# Patient Record
Sex: FEMALE | Race: WHITE | NOT HISPANIC OR LATINO | Employment: OTHER
[De-identification: names, ages, dates, MRNs, and addresses within clinical notes are randomized per-mention and may not be internally consistent; named-entity substitution may affect disease eponyms.]

---

## 2017-01-12 ENCOUNTER — HOSPITAL (OUTPATIENT)
Dept: OTHER | Age: 67
End: 2017-01-12
Attending: EMERGENCY MEDICINE

## 2017-01-18 ENCOUNTER — HOSPITAL (OUTPATIENT)
Dept: OTHER | Age: 67
End: 2017-01-18
Attending: EMERGENCY MEDICINE

## 2017-01-18 LAB
ANALYZER ANC (IANC): ABNORMAL
ANION GAP SERPL CALC-SCNC: 9 MMOL/L (ref 10–20)
BASOPHILS # BLD: 0 THOUSAND/MCL (ref 0–0.3)
BASOPHILS NFR BLD: 0 %
BUN SERPL-MCNC: 20 MG/DL (ref 10–20)
BUN/CREAT SERPL: 29 (ref 7–25)
CALCIUM SERPL-MCNC: 9.6 MG/DL (ref 8.4–10.2)
CHLORIDE: 104 MMOL/L (ref 98–107)
CO2 SERPL-SCNC: 32 MMOL/L (ref 21–32)
CREAT SERPL-MCNC: 0.69 MG/DL (ref 0.51–0.95)
DIFFERENTIAL METHOD BLD: ABNORMAL
EOSINOPHIL # BLD: 0.3 THOUSAND/MCL (ref 0.1–0.5)
EOSINOPHIL NFR BLD: 3 %
ERYTHROCYTE [DISTWIDTH] IN BLOOD: 13.8 % (ref 11–15)
GLUCOSE SERPL-MCNC: 134 MG/DL (ref 65–99)
HEMATOCRIT: 33.4 % (ref 36–46.5)
HGB BLD-MCNC: 10.8 GM/DL (ref 12–15.5)
LYMPHOCYTES # BLD: 1.8 THOUSAND/MCL (ref 1–4)
LYMPHOCYTES NFR BLD: 22 %
MCH RBC QN AUTO: 28.3 PG (ref 26–34)
MCHC RBC AUTO-ENTMCNC: 32.3 GM/DL (ref 32–36.5)
MCV RBC AUTO: 87.4 FL (ref 78–100)
MONOCYTES # BLD: 0.6 THOUSAND/MCL (ref 0.3–0.9)
MONOCYTES NFR BLD: 7 %
NEUTROPHILS # BLD: 5.7 THOUSAND/MCL (ref 1.8–7.7)
NEUTROPHILS NFR BLD: 68 %
NEUTS SEG NFR BLD: ABNORMAL %
PERCENT NRBC: ABNORMAL
PLATELET # BLD: 276 THOUSAND/MCL (ref 140–450)
POTASSIUM SERPL-SCNC: 3.4 MMOL/L (ref 3.4–5.1)
RBC # BLD: 3.82 MILLION/MCL (ref 4–5.2)
SODIUM SERPL-SCNC: 142 MMOL/L (ref 135–145)
WBC # BLD: 8.4 THOUSAND/MCL (ref 4.2–11)

## 2017-02-17 ENCOUNTER — IMAGING SERVICES (OUTPATIENT)
Dept: OTHER | Age: 67
End: 2017-02-17

## 2017-02-17 LAB
ALBUMIN SERPL-MCNC: 4 GM/DL (ref 3.6–5.1)
ALP SERPL-CCNC: 164 UNIT/L (ref 45–117)
ALT SERPL-CCNC: 20 UNIT/L
AMORPH SED URNS QL MICRO: PRESENT
ANALYZER ANC (IANC): ABNORMAL
ANALYZER ANC (IANC): ABNORMAL
ANION GAP SERPL CALC-SCNC: 10 MMOL/L (ref 10–20)
ANION GAP SERPL CALC-SCNC: 13 MMOL/L (ref 10–20)
APPEARANCE UR: ABNORMAL
AST SERPL-CCNC: 20 UNIT/L
BACTERIA #/AREA URNS HPF: ABNORMAL /HPF
BASOPHILS # BLD: 0 THOUSAND/MCL (ref 0–0.3)
BASOPHILS # BLD: 0 THOUSAND/MCL (ref 0–0.3)
BASOPHILS NFR BLD: 0 %
BASOPHILS NFR BLD: 0 %
BILIRUB CONJ SERPL-MCNC: 0.1 MG/DL (ref 0–0.2)
BILIRUB SERPL-MCNC: 0.6 MG/DL (ref 0.2–1)
BILIRUB UR QL: NEGATIVE
BUN SERPL-MCNC: 11 MG/DL (ref 10–20)
BUN SERPL-MCNC: 14 MG/DL (ref 10–20)
BUN/CREAT SERPL: 21 (ref 7–25)
BUN/CREAT SERPL: 24 (ref 7–25)
CALCIUM SERPL-MCNC: 10 MG/DL (ref 8.4–10.2)
CALCIUM SERPL-MCNC: 9 MG/DL (ref 8.4–10.2)
CAOX CRY URNS QL MICRO: ABNORMAL
CHLORIDE: 102 MMOL/L (ref 98–107)
CHLORIDE: 103 MMOL/L (ref 98–107)
CO2 SERPL-SCNC: 31 MMOL/L (ref 21–32)
CO2 SERPL-SCNC: 31 MMOL/L (ref 21–32)
COLOR UR: YELLOW
CREAT SERPL-MCNC: 0.53 MG/DL (ref 0.51–0.95)
CREAT SERPL-MCNC: 0.58 MG/DL (ref 0.51–0.95)
DIFFERENTIAL METHOD BLD: ABNORMAL
DIFFERENTIAL METHOD BLD: ABNORMAL
EOSINOPHIL # BLD: 0 THOUSAND/MCL (ref 0.1–0.5)
EOSINOPHIL # BLD: 0 THOUSAND/MCL (ref 0.1–0.5)
EOSINOPHIL NFR BLD: 0 %
EOSINOPHIL NFR BLD: 0 %
EPITH CASTS #/AREA URNS LPF: ABNORMAL /[LPF]
ERYTHROCYTE [DISTWIDTH] IN BLOOD: 13.5 % (ref 11–15)
ERYTHROCYTE [DISTWIDTH] IN BLOOD: 13.6 % (ref 11–15)
FATTY CASTS #/AREA URNS LPF: ABNORMAL /[LPF]
GLUCOSE SERPL-MCNC: 169 MG/DL (ref 65–99)
GLUCOSE SERPL-MCNC: 174 MG/DL (ref 65–99)
GLUCOSE UR-MCNC: ABNORMAL MG/DL
GRAN CASTS #/AREA URNS LPF: ABNORMAL /[LPF]
HEMATOCRIT: 37.5 % (ref 36–46.5)
HEMATOCRIT: 39.4 % (ref 36–46.5)
HGB BLD-MCNC: 12.4 GM/DL (ref 12–15.5)
HGB BLD-MCNC: 13.2 GM/DL (ref 12–15.5)
HGB UR QL: NEGATIVE
HYALINE CASTS #/AREA URNS LPF: ABNORMAL /LPF (ref 0–5)
KETONES UR-MCNC: 20 MG/DL
LACTATE BLDV-MCNC: 1.3 MMOL/L
LEUKOCYTE ESTERASE UR QL STRIP: NEGATIVE
LIPASE SERPL-CCNC: 144 UNIT/L (ref 73–393)
LYMPHOCYTES # BLD: 1.3 THOUSAND/MCL (ref 1–4)
LYMPHOCYTES # BLD: 1.5 THOUSAND/MCL (ref 1–4)
LYMPHOCYTES NFR BLD: 16 %
LYMPHOCYTES NFR BLD: 17 %
MCH RBC QN AUTO: 27.7 PG (ref 26–34)
MCH RBC QN AUTO: 28.1 PG (ref 26–34)
MCHC RBC AUTO-ENTMCNC: 33.1 GM/DL (ref 32–36.5)
MCHC RBC AUTO-ENTMCNC: 33.5 GM/DL (ref 32–36.5)
MCV RBC AUTO: 83.7 FL (ref 78–100)
MCV RBC AUTO: 83.8 FL (ref 78–100)
MIXED CELL CASTS #/AREA URNS LPF: ABNORMAL /[LPF]
MONOCYTES # BLD: 0.2 THOUSAND/MCL (ref 0.3–0.9)
MONOCYTES # BLD: 0.3 THOUSAND/MCL (ref 0.3–0.9)
MONOCYTES NFR BLD: 3 %
MONOCYTES NFR BLD: 4 %
MUCOUS THREADS URNS QL MICRO: PRESENT
NEUTROPHILS # BLD: 6.7 THOUSAND/MCL (ref 1.8–7.7)
NEUTROPHILS # BLD: 6.9 THOUSAND/MCL (ref 1.8–7.7)
NEUTROPHILS NFR BLD: 79 %
NEUTROPHILS NFR BLD: 81 %
NEUTS SEG NFR BLD: ABNORMAL %
NEUTS SEG NFR BLD: ABNORMAL %
NITRITE UR QL: NEGATIVE
PERCENT NRBC: ABNORMAL
PERCENT NRBC: ABNORMAL
PH UR: 8 UNIT (ref 5–7)
PLATELET # BLD: 177 THOUSAND/MCL (ref 140–450)
PLATELET # BLD: 196 THOUSAND/MCL (ref 140–450)
POTASSIUM SERPL-SCNC: 3.3 MMOL/L (ref 3.4–5.1)
POTASSIUM SERPL-SCNC: 3.4 MMOL/L (ref 3.4–5.1)
PROT SERPL-MCNC: 7.9 GM/DL (ref 6.4–8.2)
PROT UR QL: NEGATIVE MG/DL
RBC # BLD: 4.48 MILLION/MCL (ref 4–5.2)
RBC # BLD: 4.7 MILLION/MCL (ref 4–5.2)
RBC #/AREA URNS HPF: ABNORMAL /HPF (ref 0–3)
RBC CASTS #/AREA URNS LPF: ABNORMAL /[LPF]
RENAL EPI CELLS #/AREA URNS HPF: ABNORMAL /[HPF]
SODIUM SERPL-SCNC: 141 MMOL/L (ref 135–145)
SODIUM SERPL-SCNC: 143 MMOL/L (ref 135–145)
SP GR UR: 1.01 (ref 1–1.03)
SPECIMEN SOURCE: ABNORMAL
SPERM URNS QL MICRO: ABNORMAL
SQUAMOUS #/AREA URNS HPF: ABNORMAL /HPF (ref 0–5)
T VAGINALIS URNS QL MICRO: ABNORMAL
TRI-PHOS CRY URNS QL MICRO: ABNORMAL
URATE CRY URNS QL MICRO: ABNORMAL
URINE REFLEX: ABNORMAL
URNS CMNT MICRO: ABNORMAL
UROBILINOGEN UR QL: 0.2 MG/DL (ref 0–1)
WAXY CASTS #/AREA URNS LPF: ABNORMAL /[LPF]
WBC # BLD: 8.2 THOUSAND/MCL (ref 4.2–11)
WBC # BLD: 8.7 THOUSAND/MCL (ref 4.2–11)
WBC #/AREA URNS HPF: ABNORMAL /HPF (ref 0–5)
WBC CASTS #/AREA URNS LPF: ABNORMAL /[LPF]
YEAST HYPHAE URNS QL MICRO: ABNORMAL
YEAST URNS QL MICRO: ABNORMAL

## 2017-02-18 LAB
CREAT SERPL-MCNC: 0.52 MG/DL (ref 0.51–0.95)
POTASSIUM SERPL-SCNC: 3.2 MMOL/L (ref 3.4–5.1)

## 2017-02-19 ENCOUNTER — IMAGING SERVICES (OUTPATIENT)
Dept: OTHER | Age: 67
End: 2017-02-19

## 2017-02-19 LAB
ANION GAP SERPL CALC-SCNC: 18 MMOL/L (ref 10–20)
BUN SERPL-MCNC: 28 MG/DL (ref 10–20)
BUN/CREAT SERPL: 38 (ref 7–25)
CALCIUM SERPL-MCNC: 9.6 MG/DL (ref 8.4–10.2)
CHLORIDE: 101 MMOL/L (ref 98–107)
CO2 SERPL-SCNC: 25 MMOL/L (ref 21–32)
CREAT SERPL-MCNC: 0.73 MG/DL (ref 0.51–0.95)
GLUCOSE BLDC GLUCOMTR-MCNC: 116 MG/DL (ref 65–99)
GLUCOSE BLDC GLUCOMTR-MCNC: 200 MG/DL (ref 65–99)
GLUCOSE SERPL-MCNC: 137 MG/DL (ref 65–99)
MAGNESIUM SERPL-MCNC: 2.1 MG/DL (ref 1.7–2.4)
POTASSIUM SERPL-SCNC: 4 MMOL/L (ref 3.4–5.1)
SODIUM SERPL-SCNC: 140 MMOL/L (ref 135–145)

## 2017-02-20 ENCOUNTER — HOSPITAL (OUTPATIENT)
Dept: OTHER | Age: 67
End: 2017-02-20
Attending: FAMILY MEDICINE

## 2017-02-20 LAB
ANALYZER ANC (IANC): ABNORMAL
ANION GAP SERPL CALC-SCNC: 13 MMOL/L (ref 10–20)
ANION GAP SERPL CALC-SCNC: 14 MMOL/L (ref 10–20)
BASOPHILS # BLD: 0 THOUSAND/MCL (ref 0–0.3)
BASOPHILS NFR BLD: 0 %
BUN SERPL-MCNC: 45 MG/DL (ref 10–20)
BUN SERPL-MCNC: 52 MG/DL (ref 10–20)
BUN/CREAT SERPL: 18 (ref 7–25)
BUN/CREAT SERPL: 23 (ref 7–25)
CALCIUM SERPL-MCNC: 9.2 MG/DL (ref 8.4–10.2)
CALCIUM SERPL-MCNC: 9.4 MG/DL (ref 8.4–10.2)
CHLORIDE: 102 MMOL/L (ref 98–107)
CHLORIDE: 103 MMOL/L (ref 98–107)
CO2 SERPL-SCNC: 28 MMOL/L (ref 21–32)
CO2 SERPL-SCNC: 28 MMOL/L (ref 21–32)
CREAT SERPL-MCNC: 2.26 MG/DL (ref 0.51–0.95)
CREAT SERPL-MCNC: 2.47 MG/DL (ref 0.51–0.95)
DIFFERENTIAL METHOD BLD: ABNORMAL
EOSINOPHIL # BLD: 0.1 THOUSAND/MCL (ref 0.1–0.5)
EOSINOPHIL NFR BLD: 1 %
ERYTHROCYTE [DISTWIDTH] IN BLOOD: 14 % (ref 11–15)
GLUCOSE BLDC GLUCOMTR-MCNC: 84 MG/DL (ref 65–99)
GLUCOSE SERPL-MCNC: 130 MG/DL (ref 65–99)
GLUCOSE SERPL-MCNC: 87 MG/DL (ref 65–99)
GLYCOHEMOGLOBIN: 6.3 % (ref 4.5–5.6)
HEMATOCRIT: 41.3 % (ref 36–46.5)
HGB BLD-MCNC: 13.6 GM/DL (ref 12–15.5)
LYMPHOCYTES # BLD: 2.6 THOUSAND/MCL (ref 1–4)
LYMPHOCYTES NFR BLD: 26 %
MCH RBC QN AUTO: 27.6 PG (ref 26–34)
MCHC RBC AUTO-ENTMCNC: 32.9 GM/DL (ref 32–36.5)
MCV RBC AUTO: 83.8 FL (ref 78–100)
MONOCYTES # BLD: 1 THOUSAND/MCL (ref 0.3–0.9)
MONOCYTES NFR BLD: 10 %
NEUTROPHILS # BLD: 6.2 THOUSAND/MCL (ref 1.8–7.7)
NEUTROPHILS NFR BLD: 63 %
NEUTS SEG NFR BLD: ABNORMAL %
PERCENT NRBC: ABNORMAL
PLATELET # BLD: 191 THOUSAND/MCL (ref 140–450)
POTASSIUM SERPL-SCNC: 3.3 MMOL/L (ref 3.4–5.1)
POTASSIUM SERPL-SCNC: 3.6 MMOL/L (ref 3.4–5.1)
RBC # BLD: 4.93 MILLION/MCL (ref 4–5.2)
SODIUM SERPL-SCNC: 140 MMOL/L (ref 135–145)
SODIUM SERPL-SCNC: 141 MMOL/L (ref 135–145)
WBC # BLD: 9.9 THOUSAND/MCL (ref 4.2–11)

## 2017-02-21 ENCOUNTER — IMAGING SERVICES (OUTPATIENT)
Dept: OTHER | Age: 67
End: 2017-02-21

## 2017-02-21 LAB
ALBUMIN SERPL-MCNC: 3.3 GM/DL (ref 3.6–5.1)
ALBUMIN/GLOB SERPL: 0.9 {RATIO} (ref 1–2.4)
ALP SERPL-CCNC: 140 UNIT/L (ref 45–117)
ALT SERPL-CCNC: 15 UNIT/L
ANALYZER ANC (IANC): NORMAL
ANION GAP SERPL CALC-SCNC: 12 MMOL/L (ref 10–20)
AST SERPL-CCNC: 12 UNIT/L
BASOPHILS # BLD: 0 THOUSAND/MCL (ref 0–0.3)
BASOPHILS NFR BLD: 0 %
BILIRUB SERPL-MCNC: 0.3 MG/DL (ref 0.2–1)
BUN SERPL-MCNC: 41 MG/DL (ref 10–20)
BUN/CREAT SERPL: 35 (ref 7–25)
CALCIUM SERPL-MCNC: 9.2 MG/DL (ref 8.4–10.2)
CHLORIDE UR-SCNC: 57 MMOL/L
CHLORIDE: 104 MMOL/L (ref 98–107)
CO2 SERPL-SCNC: 28 MMOL/L (ref 21–32)
CREAT SERPL-MCNC: 1.16 MG/DL (ref 0.51–0.95)
DIFFERENTIAL METHOD BLD: NORMAL
EOSINOPHIL # BLD: 0.2 THOUSAND/MCL (ref 0.1–0.5)
EOSINOPHIL NFR BLD: 2 %
EOSINOPHIL URNS QL WRIGHT STN: NORMAL
ERYTHROCYTE [DISTWIDTH] IN BLOOD: 13.7 % (ref 11–15)
GLOBULIN SER-MCNC: 3.6 GM/DL (ref 2–4)
GLUCOSE SERPL-MCNC: 141 MG/DL (ref 65–99)
HEMATOCRIT: 40.2 % (ref 36–46.5)
HGB BLD-MCNC: 13.3 GM/DL (ref 12–15.5)
LYMPHOCYTES # BLD: 1.8 THOUSAND/MCL (ref 1–4)
LYMPHOCYTES NFR BLD: 25 %
MAGNESIUM SERPL-MCNC: 2 MG/DL (ref 1.7–2.4)
MCH RBC QN AUTO: 27.8 PG (ref 26–34)
MCHC RBC AUTO-ENTMCNC: 33.1 GM/DL (ref 32–36.5)
MCV RBC AUTO: 83.9 FL (ref 78–100)
MONOCYTES # BLD: 0.7 THOUSAND/MCL (ref 0.3–0.9)
MONOCYTES NFR BLD: 9 %
NEUTROPHILS # BLD: 4.8 THOUSAND/MCL (ref 1.8–7.7)
NEUTROPHILS NFR BLD: 64 %
NEUTS SEG NFR BLD: NORMAL %
ORGANIC ACIDS/CREAT UR-SRTO: 121.33 MG/DL
PERCENT NRBC: NORMAL
PLATELET # BLD: 190 THOUSAND/MCL (ref 140–450)
POTASSIUM SERPL-SCNC: 3.4 MMOL/L (ref 3.4–5.1)
POTASSIUM UR-SCNC: 54.6 MMOL/L
PROT SERPL-MCNC: 6.9 GM/DL (ref 6.4–8.2)
RBC # BLD: 4.79 MILLION/MCL (ref 4–5.2)
SODIUM SERPL-SCNC: 141 MMOL/L (ref 135–145)
SODIUM UR-SCNC: 15 MMOL/L
UUN UR-MCNC: 995 MG/DL
WBC # BLD: 7.5 THOUSAND/MCL (ref 4.2–11)

## 2017-02-22 LAB
ALBUMIN SERPL-MCNC: 2.8 GM/DL (ref 3.6–5.1)
ALBUMIN/GLOB SERPL: 0.9 {RATIO} (ref 1–2.4)
ALP SERPL-CCNC: 118 UNIT/L (ref 45–117)
ALT SERPL-CCNC: 12 UNIT/L
ANALYZER ANC (IANC): ABNORMAL
ANION GAP SERPL CALC-SCNC: 11 MMOL/L (ref 10–20)
AST SERPL-CCNC: 11 UNIT/L
BASOPHILS # BLD: 0 THOUSAND/MCL (ref 0–0.3)
BASOPHILS NFR BLD: 0 %
BILIRUB SERPL-MCNC: 0.3 MG/DL (ref 0.2–1)
BUN SERPL-MCNC: 19 MG/DL (ref 6–20)
BUN/CREAT SERPL: 26 (ref 7–25)
CALCIUM SERPL-MCNC: 8.9 MG/DL (ref 8.4–10.2)
CHLORIDE: 110 MMOL/L (ref 98–107)
CHOLEST SERPL-MCNC: 142 MG/DL
CHOLEST/HDLC SERPL: 4.9 {RATIO}
CO2 SERPL-SCNC: 28 MMOL/L (ref 21–32)
CREAT SERPL-MCNC: 0.72 MG/DL (ref 0.51–0.95)
DIFFERENTIAL METHOD BLD: ABNORMAL
EOSINOPHIL # BLD: 0.2 THOUSAND/MCL (ref 0.1–0.5)
EOSINOPHIL NFR BLD: 4 %
ERYTHROCYTE [DISTWIDTH] IN BLOOD: 13.6 % (ref 11–15)
GLOBULIN SER-MCNC: 3.1 GM/DL (ref 2–4)
GLUCOSE BLDC GLUCOMTR-MCNC: 99 MG/DL (ref 65–99)
GLUCOSE SERPL-MCNC: 118 MG/DL (ref 65–99)
HDLC SERPL-MCNC: 29 MG/DL
HEMATOCRIT: 34.4 % (ref 36–46.5)
HGB BLD-MCNC: 11.3 GM/DL (ref 12–15.5)
LDLC SERPL CALC-MCNC: 61 MG/DL
LYMPHOCYTES # BLD: 2.1 THOUSAND/MCL (ref 1–4)
LYMPHOCYTES NFR BLD: 36 %
MAGNESIUM SERPL-MCNC: 1.7 MG/DL (ref 1.7–2.4)
MCH RBC QN AUTO: 27.6 PG (ref 26–34)
MCHC RBC AUTO-ENTMCNC: 32.8 GM/DL (ref 32–36.5)
MCV RBC AUTO: 84.1 FL (ref 78–100)
MONOCYTES # BLD: 0.5 THOUSAND/MCL (ref 0.3–0.9)
MONOCYTES NFR BLD: 9 %
NEUTROPHILS # BLD: 3 THOUSAND/MCL (ref 1.8–7.7)
NEUTROPHILS NFR BLD: 51 %
NEUTS SEG NFR BLD: ABNORMAL %
NONHDLC SERPL-MCNC: 113 MG/DL
PERCENT NRBC: ABNORMAL
PLATELET # BLD: 166 THOUSAND/MCL (ref 140–450)
POTASSIUM SERPL-SCNC: 3.7 MMOL/L (ref 3.4–5.1)
PROT SERPL-MCNC: 5.9 GM/DL (ref 6.4–8.2)
RBC # BLD: 4.09 MILLION/MCL (ref 4–5.2)
SODIUM SERPL-SCNC: 145 MMOL/L (ref 135–145)
TRIGL SERPL-MCNC: 259 MG/DL
WBC # BLD: 5.8 THOUSAND/MCL (ref 4.2–11)

## 2017-04-19 ENCOUNTER — HOSPITAL (OUTPATIENT)
Dept: OTHER | Age: 67
End: 2017-04-19
Attending: EMERGENCY MEDICINE

## 2017-04-19 ENCOUNTER — HOSPITAL (OUTPATIENT)
Dept: OTHER | Age: 67
End: 2017-04-19
Attending: HOSPITALIST

## 2017-04-19 LAB
ALBUMIN SERPL-MCNC: 3.9 GM/DL (ref 3.6–5.1)
ALBUMIN/GLOB SERPL: 1 {RATIO} (ref 1–2.4)
ALP SERPL-CCNC: 148 UNIT/L (ref 45–117)
ALT SERPL-CCNC: 24 UNIT/L
ANALYZER ANC (IANC): ABNORMAL
ANION GAP SERPL CALC-SCNC: 16 MMOL/L (ref 10–20)
AST SERPL-CCNC: 19 UNIT/L
BASOPHILS # BLD: 0 THOUSAND/MCL (ref 0–0.3)
BASOPHILS NFR BLD: 0 %
BILIRUB SERPL-MCNC: 0.5 MG/DL (ref 0.2–1)
BUN SERPL-MCNC: 17 MG/DL (ref 6–20)
BUN/CREAT SERPL: 28 (ref 7–25)
CALCIUM SERPL-MCNC: 9.4 MG/DL (ref 8.4–10.2)
CHLORIDE: 105 MMOL/L (ref 98–107)
CO2 SERPL-SCNC: 26 MMOL/L (ref 21–32)
CREAT SERPL-MCNC: 0.6 MG/DL (ref 0.51–0.95)
DIFFERENTIAL METHOD BLD: ABNORMAL
EOSINOPHIL # BLD: 0 THOUSAND/MCL (ref 0.1–0.5)
EOSINOPHIL NFR BLD: 0 %
ERYTHROCYTE [DISTWIDTH] IN BLOOD: 14.7 % (ref 11–15)
GLOBULIN SER-MCNC: 3.9 GM/DL (ref 2–4)
GLUCOSE SERPL-MCNC: 152 MG/DL (ref 65–99)
HEMATOCRIT: 42.2 % (ref 36–46.5)
HGB BLD-MCNC: 14.2 GM/DL (ref 12–15.5)
LIPASE SERPL-CCNC: 136 UNIT/L (ref 73–393)
LYMPHOCYTES # BLD: 1.5 THOUSAND/MCL (ref 1–4)
LYMPHOCYTES NFR BLD: 15 %
MCH RBC QN AUTO: 28 PG (ref 26–34)
MCHC RBC AUTO-ENTMCNC: 33.6 GM/DL (ref 32–36.5)
MCV RBC AUTO: 83.2 FL (ref 78–100)
MONOCYTES # BLD: 0.4 THOUSAND/MCL (ref 0.3–0.9)
MONOCYTES NFR BLD: 4 %
NEUTROPHILS # BLD: 7.7 THOUSAND/MCL (ref 1.8–7.7)
NEUTROPHILS NFR BLD: 81 %
NEUTS SEG NFR BLD: ABNORMAL %
PERCENT NRBC: ABNORMAL
PLATELET # BLD: 196 THOUSAND/MCL (ref 140–450)
POTASSIUM SERPL-SCNC: 3.4 MMOL/L (ref 3.4–5.1)
PROT SERPL-MCNC: 7.8 GM/DL (ref 6.4–8.2)
RBC # BLD: 5.07 MILLION/MCL (ref 4–5.2)
SODIUM SERPL-SCNC: 144 MMOL/L (ref 135–145)
WBC # BLD: 9.6 THOUSAND/MCL (ref 4.2–11)

## 2017-04-20 LAB
ANION GAP SERPL CALC-SCNC: 14 MMOL/L (ref 10–20)
BUN SERPL-MCNC: 13 MG/DL (ref 6–20)
BUN/CREAT SERPL: 27 (ref 7–25)
CALCIUM SERPL-MCNC: 8.6 MG/DL (ref 8.4–10.2)
CHLORIDE: 104 MMOL/L (ref 98–107)
CO2 SERPL-SCNC: 26 MMOL/L (ref 21–32)
CREAT SERPL-MCNC: 0.46 MG/DL (ref 0.51–0.95)
CREAT SERPL-MCNC: 0.48 MG/DL (ref 0.51–0.95)
GLUCOSE SERPL-MCNC: 150 MG/DL (ref 65–99)
POTASSIUM SERPL-SCNC: 3 MMOL/L (ref 3.4–5.1)
POTASSIUM SERPL-SCNC: 3.7 MMOL/L (ref 3.4–5.1)
SODIUM SERPL-SCNC: 141 MMOL/L (ref 135–145)

## 2017-04-21 LAB
ANION GAP SERPL CALC-SCNC: 14 MMOL/L (ref 10–20)
BUN SERPL-MCNC: 12 MG/DL (ref 6–20)
BUN/CREAT SERPL: 26 (ref 7–25)
CALCIUM SERPL-MCNC: 8.9 MG/DL (ref 8.4–10.2)
CHLORIDE: 103 MMOL/L (ref 98–107)
CO2 SERPL-SCNC: 25 MMOL/L (ref 21–32)
CREAT SERPL-MCNC: 0.46 MG/DL (ref 0.51–0.95)
GLUCOSE SERPL-MCNC: 190 MG/DL (ref 65–99)
POTASSIUM SERPL-SCNC: 3.3 MMOL/L (ref 3.4–5.1)
SODIUM SERPL-SCNC: 139 MMOL/L (ref 135–145)

## 2017-04-27 ENCOUNTER — OFFICE VISIT (OUTPATIENT)
Dept: PAIN CLINIC | Facility: HOSPITAL | Age: 67
End: 2017-04-27
Attending: ANESTHESIOLOGY
Payer: COMMERCIAL

## 2017-04-27 VITALS
WEIGHT: 186 LBS | SYSTOLIC BLOOD PRESSURE: 143 MMHG | DIASTOLIC BLOOD PRESSURE: 76 MMHG | BODY MASS INDEX: 32.96 KG/M2 | HEIGHT: 63 IN | RESPIRATION RATE: 18 BRPM | HEART RATE: 78 BPM

## 2017-04-27 DIAGNOSIS — M79.7 FIBROMYALGIA: Primary | ICD-10-CM

## 2017-04-27 PROCEDURE — 99201 HC OUTPT EVAL AND MGNT NEW PT LEVEL 1: CPT

## 2017-04-27 RX ORDER — GABAPENTIN 300 MG/1
300 CAPSULE ORAL 3 TIMES DAILY
Qty: 90 CAPSULE | Refills: 0 | Status: SHIPPED | OUTPATIENT
Start: 2017-04-27 | End: 2017-09-28

## 2017-04-27 NOTE — CHRONIC PAIN
Pawan Leonard was referred to the CPM by her sister Christie Rodriguez for second opinion regarding chronic pain issues have been diagnosed as fibromyalgia. 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   She describes her pain If he wants the PCP continue to prescribe these therapeutic regimes

## 2017-04-27 NOTE — PROGRESS NOTES
04/27/17  PRESENTS AMBULATORY TO [facility]PM;  NEW CONSULT C/O FIBROMYALGIA PAIN;  RECENT KIARA.  KNEE REPLACEMENTS;  PT STATES SHE HAS 40YR HX OF FIBROMYALGIA;  USING NORCO FOR PAIN CONTROL;  SEEN AT [facility], AND [facility];  RETIRED RN;   EXAMINED BY DR. Brigitte Dennis;  Harsh Antoine

## 2017-09-28 ENCOUNTER — OFFICE VISIT (OUTPATIENT)
Dept: PODIATRY CLINIC | Facility: CLINIC | Age: 67
End: 2017-09-28

## 2017-09-28 DIAGNOSIS — M72.2 PLANTAR FASCIITIS OF RIGHT FOOT: Primary | ICD-10-CM

## 2017-09-28 PROCEDURE — 99213 OFFICE O/P EST LOW 20 MIN: CPT | Performed by: PODIATRIST

## 2017-09-28 PROCEDURE — 99212 OFFICE O/P EST SF 10 MIN: CPT | Performed by: PODIATRIST

## 2017-09-28 PROCEDURE — L3060 FOOT ARCH SUPP LONGITUD/META: HCPCS | Performed by: PODIATRIST

## 2017-09-28 RX ORDER — MELOXICAM 15 MG/1
TABLET ORAL
Qty: 30 TABLET | Refills: 1 | Status: SHIPPED | OUTPATIENT
Start: 2017-09-28 | End: 2018-02-22

## 2017-09-28 RX ORDER — HYDROCHLOROTHIAZIDE 25 MG/1
TABLET ORAL
COMMUNITY
Start: 2017-09-04 | End: 2018-02-22

## 2017-09-28 RX ORDER — CELECOXIB 200 MG/1
CAPSULE ORAL
COMMUNITY
Start: 2017-07-23 | End: 2018-02-22

## 2017-09-28 RX ORDER — MECLIZINE HYDROCHLORIDE 25 MG/1
TABLET ORAL
COMMUNITY
Start: 2017-09-22 | End: 2018-02-22

## 2017-09-28 RX ORDER — TRAZODONE HYDROCHLORIDE 300 MG/1
150 TABLET ORAL NIGHTLY
COMMUNITY
Start: 2017-09-12

## 2017-09-28 RX ORDER — TRAMADOL HYDROCHLORIDE 50 MG/1
TABLET ORAL
COMMUNITY
Start: 2017-09-12 | End: 2018-02-22

## 2017-09-28 NOTE — PROGRESS NOTES
HPI:    Patient ID: Ni Owen is a 75year old female. HPI  This 68-year-old female presents to the office having not been seen in a year and a half she has a new complaint in reference to right heel pain.   The pain is been present for approximately today.  Symptoms are quite consistent with plantar fasciitis. After discussion and review of this condition I dispensed an insole to support the arch. She was instructed to wear shoes at all times no barefoot walking.   She was also placed on meloxicam 15

## 2017-10-02 ENCOUNTER — HOSPITAL (OUTPATIENT)
Dept: OTHER | Age: 67
End: 2017-10-02
Attending: EMERGENCY MEDICINE

## 2017-10-02 LAB
ALBUMIN SERPL-MCNC: 3.7 GM/DL (ref 3.6–5.1)
ALP SERPL-CCNC: 150 UNIT/L (ref 45–117)
ALT SERPL-CCNC: 23 UNIT/L
ANALYZER ANC (IANC): NORMAL
ANION GAP SERPL CALC-SCNC: 16 MMOL/L (ref 10–20)
AST SERPL-CCNC: 24 UNIT/L
BASO+EOS+MONOS # BLD: 0.4 THOUSAND/MCL (ref 0.1–1.1)
BASO+EOS+MONOS NFR BLD: 5 %
BILIRUB CONJ SERPL-MCNC: 0.1 MG/DL (ref 0–0.2)
BILIRUB SERPL-MCNC: 0.6 MG/DL (ref 0.2–1)
BUN SERPL-MCNC: 16 MG/DL (ref 6–20)
BUN/CREAT SERPL: 23 (ref 7–25)
CHLORIDE: 107 MMOL/L (ref 98–107)
CO2 SERPL-SCNC: 25 MMOL/L (ref 21–32)
CREAT SERPL-MCNC: 0.7 MG/DL (ref 0.51–0.95)
DIFFERENTIAL METHOD BLD: NORMAL
ERYTHROCYTE [DISTWIDTH] IN BLOOD: 13.4 % (ref 11–15)
GLUCOSE SERPL-MCNC: 151 MG/DL (ref 65–99)
HEMATOCRIT: 43.9 % (ref 36–46.5)
HGB BLD-MCNC: 15.4 GM/DL (ref 12–15.5)
LIPASE SERPL-CCNC: 146 UNIT/L (ref 73–393)
LYMPHOCYTES # BLD: 1.4 THOUSAND/MCL (ref 1–4)
LYMPHOCYTES NFR BLD: 16 %
MCH RBC QN AUTO: 30.1 PG (ref 26–34)
MCHC RBC AUTO-ENTMCNC: 35.1 GM/DL (ref 32–36.5)
MCV RBC AUTO: 85.9 FL (ref 78–100)
NEUTROPHILS # BLD: 7.3 THOUSAND/MCL (ref 1.8–7.7)
NEUTROPHILS NFR BLD: 79 %
NEUTS SEG NFR BLD: NORMAL %
PLATELET # BLD: 174 THOUSAND/MCL (ref 140–450)
POTASSIUM SERPL-SCNC: 3.3 MMOL/L (ref 3.4–5.1)
PROT SERPL-MCNC: 7.7 GM/DL (ref 6.4–8.2)
RBC # BLD: 5.11 MILLION/MCL (ref 4–5.2)
SODIUM SERPL-SCNC: 145 MMOL/L (ref 135–145)
WBC # BLD: 9.1 THOUSAND/MCL (ref 4.2–11)

## 2017-10-09 ENCOUNTER — OFFICE VISIT (OUTPATIENT)
Dept: PODIATRY CLINIC | Facility: CLINIC | Age: 67
End: 2017-10-09

## 2017-10-09 DIAGNOSIS — M72.2 PLANTAR FASCIITIS OF RIGHT FOOT: Primary | ICD-10-CM

## 2017-10-09 PROCEDURE — 99212 OFFICE O/P EST SF 10 MIN: CPT | Performed by: PODIATRIST

## 2017-10-09 RX ORDER — POTASSIUM CHLORIDE 20 MEQ/1
TABLET, EXTENDED RELEASE ORAL
COMMUNITY
Start: 2017-10-05 | End: 2018-02-22

## 2017-10-09 NOTE — PROGRESS NOTES
HPI:    Patient ID: Luciano Newman is a 75year old female. HPI  This 69-year-old female presents for follow-up care in reference to right heel pain.   She states that the support seemed to initially help but she is not is improved as she would have antic ON#[id number]

## 2017-10-31 ENCOUNTER — HOSPITAL (OUTPATIENT)
Dept: OTHER | Age: 67
End: 2017-10-31
Attending: INTERNAL MEDICINE

## 2017-10-31 LAB
ALBUMIN SERPL-MCNC: 3.8 GM/DL (ref 3.6–5.1)
ALP SERPL-CCNC: 145 UNIT/L (ref 45–117)
ALT SERPL-CCNC: 33 UNIT/L
AMORPH SED URNS QL MICRO: ABNORMAL
ANALYZER ANC (IANC): NORMAL
ANION GAP SERPL CALC-SCNC: 15 MMOL/L (ref 10–20)
APPEARANCE UR: CLEAR
AST SERPL-CCNC: 19 UNIT/L
BACTERIA #/AREA URNS HPF: ABNORMAL /HPF
BASOPHILS # BLD: 0 THOUSAND/MCL (ref 0–0.3)
BASOPHILS NFR BLD: 0 %
BILIRUB CONJ SERPL-MCNC: 0.1 MG/DL (ref 0–0.2)
BILIRUB SERPL-MCNC: 0.4 MG/DL (ref 0.2–1)
BILIRUB UR QL: NEGATIVE
BUN SERPL-MCNC: 12 MG/DL (ref 6–20)
BUN/CREAT SERPL: 18 (ref 7–25)
CALCIUM SERPL-MCNC: 9.5 MG/DL (ref 8.4–10.2)
CAOX CRY URNS QL MICRO: ABNORMAL
CHLORIDE: 102 MMOL/L (ref 98–107)
CO2 SERPL-SCNC: 26 MMOL/L (ref 21–32)
COLOR UR: ABNORMAL
CREAT SERPL-MCNC: 0.66 MG/DL (ref 0.51–0.95)
DIFFERENTIAL METHOD BLD: NORMAL
EOSINOPHIL # BLD: 0.1 THOUSAND/MCL (ref 0.1–0.5)
EOSINOPHIL NFR BLD: 1 %
EPITH CASTS #/AREA URNS LPF: ABNORMAL /[LPF]
ERYTHROCYTE [DISTWIDTH] IN BLOOD: 13 % (ref 11–15)
FATTY CASTS #/AREA URNS LPF: ABNORMAL /[LPF]
GLUCOSE SERPL-MCNC: 118 MG/DL (ref 65–99)
GLUCOSE UR-MCNC: NEGATIVE MG/DL
GRAN CASTS #/AREA URNS LPF: ABNORMAL /[LPF]
HEMATOCRIT: 43.1 % (ref 36–46.5)
HGB BLD-MCNC: 15.2 GM/DL (ref 12–15.5)
HGB UR QL: NEGATIVE
HYALINE CASTS #/AREA URNS LPF: ABNORMAL /LPF (ref 0–5)
KETONES UR-MCNC: NEGATIVE MG/DL
LEUKOCYTE ESTERASE UR QL STRIP: NEGATIVE
LIPASE SERPL-CCNC: 194 UNIT/L (ref 73–393)
LYMPHOCYTES # BLD: 1.6 THOUSAND/MCL (ref 1–4)
LYMPHOCYTES NFR BLD: 19 %
MCH RBC QN AUTO: 30.1 PG (ref 26–34)
MCHC RBC AUTO-ENTMCNC: 35.3 GM/DL (ref 32–36.5)
MCV RBC AUTO: 85.3 FL (ref 78–100)
MIXED CELL CASTS #/AREA URNS LPF: ABNORMAL /[LPF]
MONOCYTES # BLD: 0.6 THOUSAND/MCL (ref 0.3–0.9)
MONOCYTES NFR BLD: 6 %
MUCOUS THREADS URNS QL MICRO: ABNORMAL
NEUTROPHILS # BLD: 6.2 THOUSAND/MCL (ref 1.8–7.7)
NEUTROPHILS NFR BLD: 74 %
NEUTS SEG NFR BLD: NORMAL %
NITRITE UR QL: NEGATIVE
PERCENT NRBC: NORMAL
PH UR: 8 UNIT (ref 5–7)
PLATELET # BLD: 206 THOUSAND/MCL (ref 140–450)
POTASSIUM SERPL-SCNC: 3.4 MMOL/L (ref 3.4–5.1)
PROT SERPL-MCNC: 7.7 GM/DL (ref 6.4–8.2)
PROT UR QL: NEGATIVE MG/DL
RBC # BLD: 5.05 MILLION/MCL (ref 4–5.2)
RBC #/AREA URNS HPF: ABNORMAL /HPF (ref 0–3)
RBC CASTS #/AREA URNS LPF: ABNORMAL /[LPF]
RENAL EPI CELLS #/AREA URNS HPF: ABNORMAL /[HPF]
SODIUM SERPL-SCNC: 140 MMOL/L (ref 135–145)
SP GR UR: 1.01 (ref 1–1.03)
SPECIMEN SOURCE: ABNORMAL
SPERM URNS QL MICRO: ABNORMAL
SQUAMOUS #/AREA URNS HPF: ABNORMAL /HPF (ref 0–5)
T VAGINALIS URNS QL MICRO: ABNORMAL
TRI-PHOS CRY URNS QL MICRO: ABNORMAL
URATE CRY URNS QL MICRO: ABNORMAL
URINE REFLEX: ABNORMAL
URNS CMNT MICRO: ABNORMAL
UROBILINOGEN UR QL: 0.2 MG/DL (ref 0–1)
WAXY CASTS #/AREA URNS LPF: ABNORMAL /[LPF]
WBC # BLD: 8.5 THOUSAND/MCL (ref 4.2–11)
WBC #/AREA URNS HPF: ABNORMAL /HPF (ref 0–5)
WBC CASTS #/AREA URNS LPF: ABNORMAL /[LPF]
YEAST HYPHAE URNS QL MICRO: ABNORMAL
YEAST URNS QL MICRO: ABNORMAL

## 2017-11-01 ENCOUNTER — DIAGNOSTIC TRANS (OUTPATIENT)
Dept: OTHER | Age: 67
End: 2017-11-01

## 2017-11-01 LAB
ALBUMIN SERPL-MCNC: 3.4 GM/DL (ref 3.6–5.1)
ALBUMIN/GLOB SERPL: 1 {RATIO} (ref 1–2.4)
ALP SERPL-CCNC: 126 UNIT/L (ref 45–117)
ALT SERPL-CCNC: 27 UNIT/L
ANALYZER ANC (IANC): NORMAL
ANION GAP SERPL CALC-SCNC: 9 MMOL/L (ref 10–20)
AST SERPL-CCNC: 16 UNIT/L
BASOPHILS # BLD: 0 THOUSAND/MCL (ref 0–0.3)
BASOPHILS NFR BLD: 0 %
BILIRUB SERPL-MCNC: 0.4 MG/DL (ref 0.2–1)
BUN SERPL-MCNC: 10 MG/DL (ref 6–20)
BUN/CREAT SERPL: 16 (ref 7–25)
CALCIUM SERPL-MCNC: 8.6 MG/DL (ref 8.4–10.2)
CHLORIDE: 104 MMOL/L (ref 98–107)
CO2 SERPL-SCNC: 29 MMOL/L (ref 21–32)
CREAT SERPL-MCNC: 0.64 MG/DL (ref 0.51–0.95)
DIFFERENTIAL METHOD BLD: NORMAL
EOSINOPHIL # BLD: 0.1 THOUSAND/MCL (ref 0.1–0.5)
EOSINOPHIL NFR BLD: 1 %
ERYTHROCYTE [DISTWIDTH] IN BLOOD: 12.9 % (ref 11–15)
GLOBULIN SER-MCNC: 3.5 GM/DL (ref 2–4)
GLUCOSE SERPL-MCNC: 131 MG/DL (ref 65–99)
HEMATOCRIT: 41.9 % (ref 36–46.5)
HGB BLD-MCNC: 14.6 GM/DL (ref 12–15.5)
LYMPHOCYTES # BLD: 1.7 THOUSAND/MCL (ref 1–4)
LYMPHOCYTES NFR BLD: 19 %
MAGNESIUM SERPL-MCNC: 1.9 MG/DL (ref 1.7–2.4)
MCH RBC QN AUTO: 29.9 PG (ref 26–34)
MCHC RBC AUTO-ENTMCNC: 34.8 GM/DL (ref 32–36.5)
MCV RBC AUTO: 85.9 FL (ref 78–100)
MONOCYTES # BLD: 0.8 THOUSAND/MCL (ref 0.3–0.9)
MONOCYTES NFR BLD: 9 %
NEUTROPHILS # BLD: 6.3 THOUSAND/MCL (ref 1.8–7.7)
NEUTROPHILS NFR BLD: 71 %
NEUTS SEG NFR BLD: NORMAL %
PERCENT NRBC: NORMAL
PLATELET # BLD: 223 THOUSAND/MCL (ref 140–450)
POTASSIUM SERPL-SCNC: 3.7 MMOL/L (ref 3.4–5.1)
PROT SERPL-MCNC: 6.9 GM/DL (ref 6.4–8.2)
RBC # BLD: 4.88 MILLION/MCL (ref 4–5.2)
SODIUM SERPL-SCNC: 138 MMOL/L (ref 135–145)
TSH SERPL-ACNC: 0.8 MCUNIT/ML (ref 0.35–5)
WBC # BLD: 8.8 THOUSAND/MCL (ref 4.2–11)

## 2017-11-03 ENCOUNTER — DIAGNOSTIC TRANS (OUTPATIENT)
Dept: OTHER | Age: 67
End: 2017-11-03

## 2017-11-03 ENCOUNTER — HOSPITAL (OUTPATIENT)
Dept: OTHER | Age: 67
End: 2017-11-03
Attending: INTERNAL MEDICINE

## 2017-11-03 LAB
ALBUMIN SERPL-MCNC: 3.8 GM/DL (ref 3.6–5.1)
ALP SERPL-CCNC: 132 UNIT/L (ref 45–117)
ALT SERPL-CCNC: 24 UNIT/L
ANALYZER ANC (IANC): NORMAL
ANION GAP SERPL CALC-SCNC: 15 MMOL/L (ref 10–20)
AST SERPL-CCNC: 18 UNIT/L
BASOPHILS # BLD: 0 THOUSAND/MCL (ref 0–0.3)
BASOPHILS NFR BLD: 0 %
BILIRUB CONJ SERPL-MCNC: 0.1 MG/DL (ref 0–0.2)
BILIRUB SERPL-MCNC: 0.2 MG/DL (ref 0.2–1)
BUN SERPL-MCNC: 16 MG/DL (ref 6–20)
BUN/CREAT SERPL: 20 (ref 7–25)
CALCIUM SERPL-MCNC: 9.1 MG/DL (ref 8.4–10.2)
CHLORIDE: 106 MMOL/L (ref 98–107)
CO2 SERPL-SCNC: 25 MMOL/L (ref 21–32)
CREAT SERPL-MCNC: 0.81 MG/DL (ref 0.51–0.95)
DIFFERENTIAL METHOD BLD: NORMAL
EOSINOPHIL # BLD: 0.2 THOUSAND/MCL (ref 0.1–0.5)
EOSINOPHIL NFR BLD: 2 %
ERYTHROCYTE [DISTWIDTH] IN BLOOD: 13 % (ref 11–15)
GLUCOSE SERPL-MCNC: 136 MG/DL (ref 65–99)
HEMATOCRIT: 43 % (ref 36–46.5)
HGB BLD-MCNC: 15.1 GM/DL (ref 12–15.5)
LIPASE SERPL-CCNC: 240 UNIT/L (ref 73–393)
LYMPHOCYTES # BLD: 1.7 THOUSAND/MCL (ref 1–4)
LYMPHOCYTES NFR BLD: 20 %
MCH RBC QN AUTO: 30.6 PG (ref 26–34)
MCHC RBC AUTO-ENTMCNC: 35.1 GM/DL (ref 32–36.5)
MCV RBC AUTO: 87 FL (ref 78–100)
MONOCYTES # BLD: 0.7 THOUSAND/MCL (ref 0.3–0.9)
MONOCYTES NFR BLD: 8 %
NEUTROPHILS # BLD: 6.3 THOUSAND/MCL (ref 1.8–7.7)
NEUTROPHILS NFR BLD: 70 %
NEUTS SEG NFR BLD: NORMAL %
PERCENT NRBC: NORMAL
PLATELET # BLD: 213 THOUSAND/MCL (ref 140–450)
POTASSIUM SERPL-SCNC: 3.8 MMOL/L (ref 3.4–5.1)
PROT SERPL-MCNC: 7.5 GM/DL (ref 6.4–8.2)
RBC # BLD: 4.94 MILLION/MCL (ref 4–5.2)
SODIUM SERPL-SCNC: 142 MMOL/L (ref 135–145)
WBC # BLD: 8.9 THOUSAND/MCL (ref 4.2–11)

## 2017-11-04 LAB
ALBUMIN SERPL-MCNC: 2.8 GM/DL (ref 3.6–5.1)
ALBUMIN/GLOB SERPL: 0.9 {RATIO} (ref 1–2.4)
ALP SERPL-CCNC: 102 UNIT/L (ref 45–117)
ALT SERPL-CCNC: 19 UNIT/L
ANALYZER ANC (IANC): NORMAL
ANION GAP SERPL CALC-SCNC: 12 MMOL/L (ref 10–20)
AST SERPL-CCNC: 13 UNIT/L
BASOPHILS # BLD: 0 THOUSAND/MCL (ref 0–0.3)
BASOPHILS NFR BLD: 0 %
BILIRUB SERPL-MCNC: 0.2 MG/DL (ref 0.2–1)
BUN SERPL-MCNC: 7 MG/DL (ref 6–20)
BUN/CREAT SERPL: 10 (ref 7–25)
CALCIUM SERPL-MCNC: 8.9 MG/DL (ref 8.4–10.2)
CHLORIDE: 108 MMOL/L (ref 98–107)
CO2 SERPL-SCNC: 27 MMOL/L (ref 21–32)
CREAT SERPL-MCNC: 0.73 MG/DL (ref 0.51–0.95)
DIFFERENTIAL METHOD BLD: NORMAL
EOSINOPHIL # BLD: 0.2 THOUSAND/MCL (ref 0.1–0.5)
EOSINOPHIL NFR BLD: 3 %
ERYTHROCYTE [DISTWIDTH] IN BLOOD: 12.8 % (ref 11–15)
GLOBULIN SER-MCNC: 3 GM/DL (ref 2–4)
GLUCOSE SERPL-MCNC: 114 MG/DL (ref 65–99)
HEMATOCRIT: 38.4 % (ref 36–46.5)
HGB BLD-MCNC: 12.9 GM/DL (ref 12–15.5)
LYMPHOCYTES # BLD: 1.7 THOUSAND/MCL (ref 1–4)
LYMPHOCYTES NFR BLD: 19 %
MAGNESIUM SERPL-MCNC: 1.8 MG/DL (ref 1.7–2.4)
MCH RBC QN AUTO: 29.5 PG (ref 26–34)
MCHC RBC AUTO-ENTMCNC: 33.6 GM/DL (ref 32–36.5)
MCV RBC AUTO: 87.7 FL (ref 78–100)
MONOCYTES # BLD: 0.8 THOUSAND/MCL (ref 0.3–0.9)
MONOCYTES NFR BLD: 9 %
NEUTROPHILS # BLD: 6.2 THOUSAND/MCL (ref 1.8–7.7)
NEUTROPHILS NFR BLD: 69 %
NEUTS SEG NFR BLD: NORMAL %
PERCENT NRBC: NORMAL
PLATELET # BLD: 184 THOUSAND/MCL (ref 140–450)
POTASSIUM SERPL-SCNC: 3.6 MMOL/L (ref 3.4–5.1)
PROT SERPL-MCNC: 5.8 GM/DL (ref 6.4–8.2)
RBC # BLD: 4.38 MILLION/MCL (ref 4–5.2)
SODIUM SERPL-SCNC: 143 MMOL/L (ref 135–145)
WBC # BLD: 8.9 THOUSAND/MCL (ref 4.2–11)

## 2017-11-08 ENCOUNTER — OFFICE VISIT (OUTPATIENT)
Dept: PODIATRY CLINIC | Facility: CLINIC | Age: 67
End: 2017-11-08

## 2017-11-08 VITALS — RESPIRATION RATE: 16 BRPM | HEART RATE: 76 BPM | DIASTOLIC BLOOD PRESSURE: 74 MMHG | SYSTOLIC BLOOD PRESSURE: 128 MMHG

## 2017-11-08 DIAGNOSIS — M72.2 PLANTAR FASCIITIS OF RIGHT FOOT: Primary | ICD-10-CM

## 2017-11-08 PROCEDURE — 20550 NJX 1 TENDON SHEATH/LIGAMENT: CPT | Performed by: PODIATRIST

## 2017-11-08 PROCEDURE — 99213 OFFICE O/P EST LOW 20 MIN: CPT | Performed by: PODIATRIST

## 2017-11-08 RX ORDER — METHYLPREDNISOLONE ACETATE 80 MG/ML
20 INJECTION, SUSPENSION INTRA-ARTICULAR; INTRALESIONAL; INTRAMUSCULAR; SOFT TISSUE ONCE
Status: COMPLETED | OUTPATIENT
Start: 2017-11-08 | End: 2017-11-08

## 2017-11-08 RX ORDER — IBUPROFEN 600 MG/1
600 TABLET ORAL
Qty: 60 TABLET | Refills: 1 | Status: SHIPPED | OUTPATIENT
Start: 2017-11-08

## 2017-11-08 RX ADMIN — METHYLPREDNISOLONE ACETATE 20 MG: 80 INJECTION, SUSPENSION INTRA-ARTICULAR; INTRALESIONAL; INTRAMUSCULAR; SOFT TISSUE at 10:22:00

## 2017-11-08 NOTE — PROGRESS NOTES
HPI:    Patient ID: Nicki Mortensen is a 75year old female. HPI  This 57-year-old female continues to have right heel pain. Pain is present with every weightbearing activity and she states that the conservative treatment presently is not helping.   It is (primary encounter diagnosis)      Orders Placed This Encounter      tendon sheath/ligament    Meds This Visit:  Signed Prescriptions Disp Refills    ibuprofen 600 MG Oral Tab 60 tablet 1      Sig: Take 1 tablet (600 mg total) by mouth 3 (three) times ramona

## 2018-02-22 ENCOUNTER — OFFICE VISIT (OUTPATIENT)
Dept: PODIATRY CLINIC | Facility: CLINIC | Age: 68
End: 2018-02-22

## 2018-02-22 VITALS — DIASTOLIC BLOOD PRESSURE: 87 MMHG | HEART RATE: 76 BPM | RESPIRATION RATE: 14 BRPM | SYSTOLIC BLOOD PRESSURE: 161 MMHG

## 2018-02-22 DIAGNOSIS — M72.2 PLANTAR FASCIITIS OF RIGHT FOOT: Primary | ICD-10-CM

## 2018-02-22 PROCEDURE — 99213 OFFICE O/P EST LOW 20 MIN: CPT | Performed by: PODIATRIST

## 2018-02-22 PROCEDURE — 20550 NJX 1 TENDON SHEATH/LIGAMENT: CPT | Performed by: PODIATRIST

## 2018-02-22 RX ORDER — METHYLPREDNISOLONE ACETATE 80 MG/ML
20 INJECTION, SUSPENSION INTRA-ARTICULAR; INTRALESIONAL; INTRAMUSCULAR; SOFT TISSUE ONCE
Status: COMPLETED | OUTPATIENT
Start: 2018-02-22 | End: 2018-02-22

## 2018-02-22 RX ORDER — DULOXETIN HYDROCHLORIDE 30 MG/1
CAPSULE, DELAYED RELEASE ORAL
COMMUNITY
Start: 2018-01-16

## 2018-02-22 RX ADMIN — METHYLPREDNISOLONE ACETATE 20 MG: 80 INJECTION, SUSPENSION INTRA-ARTICULAR; INTRALESIONAL; INTRAMUSCULAR; SOFT TISSUE at 08:45:00

## 2018-02-22 NOTE — PROGRESS NOTES
HPI:    Patient ID: Ni Owen is a 75year old female. HPI  This 50-year-old female presents with a chief complaint of right heel pain. Has been present for the last several months.   The pain that she is experiencing in this right heel is the same understanding         ASSESSMENT/PLAN:   Plantar fasciitis of right foot  (primary encounter diagnosis)      Orders Placed This Encounter      tendon sheath/ligament    Meds This Visit:  No prescriptions requested or ordered in this encounter       Imaging

## 2018-02-22 NOTE — PROGRESS NOTES
Per verbal order from Dr. Sonya Suero, draw up 0.5ml of 0.5% Marcaine and 20 mg of Depo-Medrol for cortisone injection to right heel.   Aniceto Ibarra RN

## 2018-09-13 ENCOUNTER — OFFICE VISIT (OUTPATIENT)
Dept: PODIATRY CLINIC | Facility: CLINIC | Age: 68
End: 2018-09-13
Payer: COMMERCIAL

## 2018-09-13 VITALS — RESPIRATION RATE: 16 BRPM | SYSTOLIC BLOOD PRESSURE: 145 MMHG | HEART RATE: 76 BPM | DIASTOLIC BLOOD PRESSURE: 81 MMHG

## 2018-09-13 DIAGNOSIS — M72.2 PLANTAR FASCIITIS OF RIGHT FOOT: Primary | ICD-10-CM

## 2018-09-13 PROCEDURE — 20550 NJX 1 TENDON SHEATH/LIGAMENT: CPT | Performed by: PODIATRIST

## 2018-09-13 PROCEDURE — 99213 OFFICE O/P EST LOW 20 MIN: CPT | Performed by: PODIATRIST

## 2018-09-13 PROCEDURE — L3060 FOOT ARCH SUPP LONGITUD/META: HCPCS | Performed by: PODIATRIST

## 2018-09-13 PROCEDURE — 99212 OFFICE O/P EST SF 10 MIN: CPT | Performed by: PODIATRIST

## 2018-09-13 NOTE — PROGRESS NOTES
HPI:    Patient ID: Luciano Newman is a 70year old female. HPI  This 58-year-old female presents with right heel pain. Since I last saw this patient she had complete resolution of pain from the injection. Is redeveloped in the last month.   She describ sheath/ligament      Meds This Visit:  Requested Prescriptions      No prescriptions requested or ordered in this encounter       Imaging & Referrals:  None       RX#[id number]

## 2018-09-13 NOTE — PROGRESS NOTES
Per verbal order from Dr. Tessa Li, draw up 0.5ml of 0.25% Marcaine and 20 mg of Depo-Medrol for cortisone injection to the right foot. Jaylene Ontiveros    No post injection vitals taken.

## 2018-09-22 ENCOUNTER — HOSPITAL (OUTPATIENT)
Dept: OTHER | Age: 68
End: 2018-09-22
Attending: FAMILY MEDICINE

## 2018-09-22 LAB
ALBUMIN SERPL-MCNC: 4.2 GM/DL (ref 3.6–5.1)
ALBUMIN/GLOB SERPL: 1 {RATIO} (ref 1–2.4)
ALP SERPL-CCNC: 132 UNIT/L (ref 45–117)
ALT SERPL-CCNC: 29 UNIT/L
AMORPH SED URNS QL MICRO: ABNORMAL
ANALYZER ANC (IANC): ABNORMAL
ANION GAP SERPL CALC-SCNC: 14 MMOL/L (ref 10–20)
APPEARANCE UR: ABNORMAL
AST SERPL-CCNC: 22 UNIT/L
BACTERIA #/AREA URNS HPF: ABNORMAL /HPF
BASOPHILS # BLD: 0 THOUSAND/MCL (ref 0–0.3)
BASOPHILS NFR BLD: 0 %
BILIRUB SERPL-MCNC: 0.6 MG/DL (ref 0.2–1)
BILIRUB UR QL: NEGATIVE
BUN SERPL-MCNC: 15 MG/DL (ref 6–20)
BUN/CREAT SERPL: 25 (ref 7–25)
CALCIUM SERPL-MCNC: 10.1 MG/DL (ref 8.4–10.2)
CAOX CRY URNS QL MICRO: ABNORMAL
CHLORIDE: 101 MMOL/L (ref 98–107)
CO2 SERPL-SCNC: 28 MMOL/L (ref 21–32)
COLOR UR: YELLOW
CREAT SERPL-MCNC: 0.61 MG/DL (ref 0.51–0.95)
DIFFERENTIAL METHOD BLD: ABNORMAL
EOSINOPHIL # BLD: 0 THOUSAND/MCL (ref 0.1–0.5)
EOSINOPHIL NFR BLD: 0 %
EPITH CASTS #/AREA URNS LPF: ABNORMAL /[LPF]
ERYTHROCYTE [DISTWIDTH] IN BLOOD: 11.9 % (ref 11–15)
FATTY CASTS #/AREA URNS LPF: ABNORMAL /[LPF]
GLOBULIN SER-MCNC: 4.1 GM/DL (ref 2–4)
GLUCOSE SERPL-MCNC: 162 MG/DL (ref 65–99)
GLUCOSE UR-MCNC: NEGATIVE MG/DL
GRAN CASTS #/AREA URNS LPF: ABNORMAL /[LPF]
HEMATOCRIT: 47.6 % (ref 36–46.5)
HGB BLD-MCNC: 16.7 GM/DL (ref 12–15.5)
HGB UR QL: ABNORMAL
HYALINE CASTS #/AREA URNS LPF: ABNORMAL /LPF (ref 0–5)
KETONES UR-MCNC: 80 MG/DL
LEUKOCYTE ESTERASE UR QL STRIP: NEGATIVE
LIPASE SERPL-CCNC: 109 UNIT/L (ref 73–393)
LYMPHOCYTES # BLD: 1.2 THOUSAND/MCL (ref 1–4)
LYMPHOCYTES NFR BLD: 10 %
MCH RBC QN AUTO: 30.9 PG (ref 26–34)
MCHC RBC AUTO-ENTMCNC: 35.1 GM/DL (ref 32–36.5)
MCV RBC AUTO: 88.1 FL (ref 78–100)
MIXED CELL CASTS #/AREA URNS LPF: ABNORMAL /[LPF]
MONOCYTES # BLD: 0.3 THOUSAND/MCL (ref 0.3–0.9)
MONOCYTES NFR BLD: 2 %
MUCOUS THREADS URNS QL MICRO: PRESENT
NEUTROPHILS # BLD: 10.2 THOUSAND/MCL (ref 1.8–7.7)
NEUTROPHILS NFR BLD: 88 %
NEUTS SEG NFR BLD: ABNORMAL %
NITRITE UR QL: NEGATIVE
NRBC (NRBCRE): ABNORMAL
PH UR: 7 UNIT (ref 5–7)
PLATELET # BLD: 182 THOUSAND/MCL (ref 140–450)
POTASSIUM SERPL-SCNC: 4 MMOL/L (ref 3.4–5.1)
PROT SERPL-MCNC: 8.3 GM/DL (ref 6.4–8.2)
PROT UR QL: 30 MG/DL
RBC # BLD: 5.4 MILLION/MCL (ref 4–5.2)
RBC #/AREA URNS HPF: ABNORMAL /HPF (ref 0–3)
RBC CASTS #/AREA URNS LPF: ABNORMAL /[LPF]
RENAL EPI CELLS #/AREA URNS HPF: ABNORMAL /[HPF]
SODIUM SERPL-SCNC: 139 MMOL/L (ref 135–145)
SP GR UR: 1.02 (ref 1–1.03)
SPECIMEN SOURCE: ABNORMAL
SPERM URNS QL MICRO: ABNORMAL
SQUAMOUS #/AREA URNS HPF: ABNORMAL /HPF (ref 0–5)
T VAGINALIS URNS QL MICRO: ABNORMAL
TRI-PHOS CRY URNS QL MICRO: ABNORMAL
URATE CRY URNS QL MICRO: ABNORMAL
URINE REFLEX: ABNORMAL
URNS CMNT MICRO: ABNORMAL
UROBILINOGEN UR QL: 0.2 MG/DL (ref 0–1)
WAXY CASTS #/AREA URNS LPF: ABNORMAL /[LPF]
WBC # BLD: 11.7 THOUSAND/MCL (ref 4.2–11)
WBC #/AREA URNS HPF: ABNORMAL /HPF (ref 0–5)
WBC CASTS #/AREA URNS LPF: ABNORMAL /[LPF]
YEAST HYPHAE URNS QL MICRO: ABNORMAL
YEAST URNS QL MICRO: ABNORMAL

## 2018-09-23 LAB
ALBUMIN SERPL-MCNC: 3.4 GM/DL (ref 3.6–5.1)
ALBUMIN/GLOB SERPL: 0.9 {RATIO} (ref 1–2.4)
ALP SERPL-CCNC: 108 UNIT/L (ref 45–117)
ALT SERPL-CCNC: 21 UNIT/L
AMYLASE SERPL-CCNC: 68 UNIT/L (ref 25–115)
ANALYZER ANC (IANC): ABNORMAL
ANION GAP SERPL CALC-SCNC: 15 MMOL/L (ref 10–20)
AST SERPL-CCNC: 15 UNIT/L
BASOPHILS # BLD: 0 THOUSAND/MCL (ref 0–0.3)
BASOPHILS NFR BLD: 0 %
BILIRUB SERPL-MCNC: 0.6 MG/DL (ref 0.2–1)
BUN SERPL-MCNC: 19 MG/DL (ref 6–20)
BUN/CREAT SERPL: 33 (ref 7–25)
CALCIUM SERPL-MCNC: 9.7 MG/DL (ref 8.4–10.2)
CHLORIDE: 103 MMOL/L (ref 98–107)
CO2 SERPL-SCNC: 24 MMOL/L (ref 21–32)
CREAT SERPL-MCNC: 0.57 MG/DL (ref 0.51–0.95)
DIFFERENTIAL METHOD BLD: ABNORMAL
EOSINOPHIL # BLD: 0 THOUSAND/MCL (ref 0.1–0.5)
EOSINOPHIL NFR BLD: 0 %
ERYTHROCYTE [DISTWIDTH] IN BLOOD: 12.1 % (ref 11–15)
GLOBULIN SER-MCNC: 3.7 GM/DL (ref 2–4)
GLUCOSE SERPL-MCNC: 145 MG/DL (ref 65–99)
HEMATOCRIT: 42.9 % (ref 36–46.5)
HGB BLD-MCNC: 14.7 GM/DL (ref 12–15.5)
LIPASE SERPL-CCNC: 88 UNIT/L (ref 73–393)
LYMPHOCYTES # BLD: 1.1 THOUSAND/MCL (ref 1–4)
LYMPHOCYTES NFR BLD: 10 %
MAGNESIUM SERPL-MCNC: 1.8 MG/DL (ref 1.7–2.4)
MCH RBC QN AUTO: 30.3 PG (ref 26–34)
MCHC RBC AUTO-ENTMCNC: 34.3 GM/DL (ref 32–36.5)
MCV RBC AUTO: 88.5 FL (ref 78–100)
MONOCYTES # BLD: 0.6 THOUSAND/MCL (ref 0.3–0.9)
MONOCYTES NFR BLD: 5 %
NEUTROPHILS # BLD: 9.6 THOUSAND/MCL (ref 1.8–7.7)
NEUTROPHILS NFR BLD: 85 %
NEUTS SEG NFR BLD: ABNORMAL %
NRBC (NRBCRE): ABNORMAL
PLATELET # BLD: 166 THOUSAND/MCL (ref 140–450)
POTASSIUM SERPL-SCNC: 3.3 MMOL/L (ref 3.4–5.1)
PROT SERPL-MCNC: 7.1 GM/DL (ref 6.4–8.2)
RBC # BLD: 4.85 MILLION/MCL (ref 4–5.2)
SODIUM SERPL-SCNC: 139 MMOL/L (ref 135–145)
WBC # BLD: 11.4 THOUSAND/MCL (ref 4.2–11)

## 2018-09-24 LAB
CREAT SERPL-MCNC: 0.48 MG/DL (ref 0.51–0.95)
MAGNESIUM SERPL-MCNC: 1.9 MG/DL (ref 1.7–2.4)
POTASSIUM SERPL-SCNC: 3.2 MMOL/L (ref 3.4–5.1)

## 2019-10-16 ENCOUNTER — HOSPITAL (OUTPATIENT)
Dept: OTHER | Age: 69
End: 2019-10-16

## 2019-10-16 ENCOUNTER — DIAGNOSTIC TRANS (OUTPATIENT)
Dept: OTHER | Age: 69
End: 2019-10-16

## 2019-10-16 LAB
ANALYZER ANC (IANC): NORMAL
ANION GAP SERPL CALC-SCNC: 6 MMOL/L (ref 10–20)
APTT PPP: 24 SEC (ref 22–32)
APTT PPP: NORMAL S
APTT PPP: NORMAL S
BASOPHILS # BLD: 0 K/MCL (ref 0–0.3)
BASOPHILS NFR BLD: 1 %
BUN SERPL-MCNC: 19 MG/DL (ref 6–20)
BUN/CREAT SERPL: 29 (ref 7–25)
CALCIUM SERPL-MCNC: 9 MG/DL (ref 8.4–10.2)
CHLORIDE SERPL-SCNC: 109 MMOL/L (ref 98–107)
CO2 SERPL-SCNC: 31 MMOL/L (ref 21–32)
CREAT SERPL-MCNC: 0.65 MG/DL (ref 0.51–0.95)
DIFFERENTIAL METHOD BLD: NORMAL
EOSINOPHIL # BLD: 0.2 K/MCL (ref 0.1–0.5)
EOSINOPHIL NFR BLD: 3 %
ERYTHROCYTE [DISTWIDTH] IN BLOOD: 11.9 % (ref 11–15)
GLUCOSE SERPL-MCNC: 146 MG/DL (ref 65–99)
HCT VFR BLD CALC: 42.1 % (ref 36–46.5)
HGB BLD-MCNC: 13.7 G/DL (ref 12–15.5)
IMM GRANULOCYTES # BLD AUTO: 0 K/MCL (ref 0–0.2)
IMM GRANULOCYTES NFR BLD: 1 %
INR PPP: 1
INR PPP: NORMAL
LYMPHOCYTES # BLD: 1.5 K/MCL (ref 1–4)
LYMPHOCYTES NFR BLD: 25 %
MCH RBC QN AUTO: 30 PG (ref 26–34)
MCHC RBC AUTO-ENTMCNC: 32.5 G/DL (ref 32–36.5)
MCV RBC AUTO: 92.3 FL (ref 78–100)
MONOCYTES # BLD: 0.5 K/MCL (ref 0.3–0.9)
MONOCYTES NFR BLD: 8 %
NEUTROPHILS # BLD: 3.7 K/MCL (ref 1.8–7.7)
NEUTROPHILS NFR BLD: 62 %
NEUTS SEG NFR BLD: NORMAL %
NRBC (NRBCRE): 0 /100 WBC
PLATELET # BLD: 164 K/MCL (ref 140–450)
POTASSIUM SERPL-SCNC: 3.8 MMOL/L (ref 3.4–5.1)
PROTHROMBIN TIME (PRT2): NORMAL
PROTHROMBIN TIME: 10 SEC (ref 9.7–11.8)
RBC # BLD: 4.56 MIL/MCL (ref 4–5.2)
SODIUM SERPL-SCNC: 142 MMOL/L (ref 135–145)
WBC # BLD: 5.9 K/MCL (ref 4.2–11)

## 2019-10-16 PROCEDURE — 99223 1ST HOSP IP/OBS HIGH 75: CPT | Performed by: INTERNAL MEDICINE

## 2019-10-17 ENCOUNTER — HOSPITAL (OUTPATIENT)
Dept: OTHER | Age: 69
End: 2019-10-17

## 2019-10-17 LAB
ALBUMIN SERPL-MCNC: 3.2 G/DL (ref 3.6–5.1)
ALBUMIN/GLOB SERPL: 1.1 {RATIO} (ref 1–2.4)
ALP SERPL-CCNC: 110 UNITS/L (ref 45–117)
ALT SERPL-CCNC: 21 UNITS/L
ANALYZER ANC (IANC): NORMAL
ANALYZER ANC (IANC): NORMAL
ANION GAP SERPL CALC-SCNC: 10 MMOL/L (ref 10–20)
ANION GAP SERPL CALC-SCNC: 10 MMOL/L (ref 10–20)
AST SERPL-CCNC: 18 UNITS/L
BASOPHILS # BLD: 0 K/MCL (ref 0–0.3)
BASOPHILS # BLD: 0 K/MCL (ref 0–0.3)
BASOPHILS NFR BLD: 0 %
BASOPHILS NFR BLD: 1 %
BILIRUB SERPL-MCNC: 0.3 MG/DL (ref 0.2–1)
BUN SERPL-MCNC: 16 MG/DL (ref 6–20)
BUN SERPL-MCNC: 19 MG/DL (ref 6–20)
BUN/CREAT SERPL: 23 (ref 7–25)
BUN/CREAT SERPL: 31 (ref 7–25)
CALCIUM SERPL-MCNC: 8.5 MG/DL (ref 8.4–10.2)
CALCIUM SERPL-MCNC: 8.6 MG/DL (ref 8.4–10.2)
CHLORIDE SERPL-SCNC: 109 MMOL/L (ref 98–107)
CHLORIDE SERPL-SCNC: 110 MMOL/L (ref 98–107)
CO2 SERPL-SCNC: 28 MMOL/L (ref 21–32)
CO2 SERPL-SCNC: 29 MMOL/L (ref 21–32)
CREAT SERPL-MCNC: 0.62 MG/DL (ref 0.51–0.95)
CREAT SERPL-MCNC: 0.71 MG/DL (ref 0.51–0.95)
DIFFERENTIAL METHOD BLD: NORMAL
DIFFERENTIAL METHOD BLD: NORMAL
EOSINOPHIL # BLD: 0.2 K/MCL (ref 0.1–0.5)
EOSINOPHIL # BLD: 0.2 K/MCL (ref 0.1–0.5)
EOSINOPHIL NFR BLD: 2 %
EOSINOPHIL NFR BLD: 4 %
ERYTHROCYTE [DISTWIDTH] IN BLOOD: 11.9 % (ref 11–15)
ERYTHROCYTE [DISTWIDTH] IN BLOOD: 11.9 % (ref 11–15)
GLOBULIN SER-MCNC: 2.8 G/DL (ref 2–4)
GLUCOSE SERPL-MCNC: 117 MG/DL (ref 65–99)
GLUCOSE SERPL-MCNC: 95 MG/DL (ref 65–99)
HCT VFR BLD CALC: 37.3 % (ref 36–46.5)
HCT VFR BLD CALC: 38.1 % (ref 36–46.5)
HGB BLD-MCNC: 12.2 G/DL (ref 12–15.5)
HGB BLD-MCNC: 12.4 G/DL (ref 12–15.5)
IMM GRANULOCYTES # BLD AUTO: 0 K/MCL (ref 0–0.2)
IMM GRANULOCYTES # BLD AUTO: 0 K/MCL (ref 0–0.2)
IMM GRANULOCYTES NFR BLD: 0 %
IMM GRANULOCYTES NFR BLD: 0 %
LYMPHOCYTES # BLD: 1.3 K/MCL (ref 1–4)
LYMPHOCYTES # BLD: 1.9 K/MCL (ref 1–4)
LYMPHOCYTES NFR BLD: 15 %
LYMPHOCYTES NFR BLD: 33 %
MAGNESIUM SERPL-MCNC: 2.1 MG/DL (ref 1.7–2.4)
MCH RBC QN AUTO: 30.1 PG (ref 26–34)
MCH RBC QN AUTO: 30.3 PG (ref 26–34)
MCHC RBC AUTO-ENTMCNC: 32.5 G/DL (ref 32–36.5)
MCHC RBC AUTO-ENTMCNC: 32.7 G/DL (ref 32–36.5)
MCV RBC AUTO: 92.1 FL (ref 78–100)
MCV RBC AUTO: 93.2 FL (ref 78–100)
MONOCYTES # BLD: 0.5 K/MCL (ref 0.3–0.9)
MONOCYTES # BLD: 0.5 K/MCL (ref 0.3–0.9)
MONOCYTES NFR BLD: 5 %
MONOCYTES NFR BLD: 9 %
NEUTROPHILS # BLD: 3.1 K/MCL (ref 1.8–7.7)
NEUTROPHILS # BLD: 7 K/MCL (ref 1.8–7.7)
NEUTROPHILS NFR BLD: 53 %
NEUTROPHILS NFR BLD: 78 %
NEUTS SEG NFR BLD: NORMAL %
NEUTS SEG NFR BLD: NORMAL %
NRBC (NRBCRE): 0 /100 WBC
NRBC (NRBCRE): 0 /100 WBC
PLATELET # BLD: 146 K/MCL (ref 140–450)
PLATELET # BLD: 150 K/MCL (ref 140–450)
POTASSIUM SERPL-SCNC: 4 MMOL/L (ref 3.4–5.1)
POTASSIUM SERPL-SCNC: 4.1 MMOL/L (ref 3.4–5.1)
PROT SERPL-MCNC: 6 G/DL (ref 6.4–8.2)
RBC # BLD: 4.05 MIL/MCL (ref 4–5.2)
RBC # BLD: 4.09 MIL/MCL (ref 4–5.2)
SODIUM SERPL-SCNC: 144 MMOL/L (ref 135–145)
SODIUM SERPL-SCNC: 144 MMOL/L (ref 135–145)
WBC # BLD: 5.7 K/MCL (ref 4.2–11)
WBC # BLD: 9 K/MCL (ref 4.2–11)

## 2019-10-17 PROCEDURE — 99233 SBSQ HOSP IP/OBS HIGH 50: CPT | Performed by: INTERNAL MEDICINE

## 2019-10-17 PROCEDURE — 99255 IP/OBS CONSLTJ NEW/EST HI 80: CPT | Performed by: INTERNAL MEDICINE

## 2019-10-18 LAB
ANALYZER ANC (IANC): ABNORMAL
ERYTHROCYTE [DISTWIDTH] IN BLOOD: 11.7 % (ref 11–15)
FACT VIII ACT/NOR PPP: 143 % (ref 50–150)
HCT VFR BLD CALC: 35.1 % (ref 36–46.5)
HGB BLD-MCNC: 11.7 G/DL (ref 12–15.5)
MCH RBC QN AUTO: 30.6 PG (ref 26–34)
MCHC RBC AUTO-ENTMCNC: 33.3 G/DL (ref 32–36.5)
MCV RBC AUTO: 91.9 FL (ref 78–100)
NRBC (NRBCRE): 0 /100 WBC
PLATELET # BLD: 156 K/MCL (ref 140–450)
RBC # BLD: 3.82 MIL/MCL (ref 4–5.2)
VWF AG ACT/NOR PPP IA: 191 % (ref 53–172)
VWF:RCO ACT/NOR PPP PL AGG: 137 % (ref 49–151)
WBC # BLD: 7.6 K/MCL (ref 4.2–11)

## 2019-10-18 PROCEDURE — 99239 HOSP IP/OBS DSCHRG MGMT >30: CPT | Performed by: INTERNAL MEDICINE

## 2020-08-06 ENCOUNTER — OFFICE VISIT (OUTPATIENT)
Dept: PODIATRY CLINIC | Facility: CLINIC | Age: 70
End: 2020-08-06
Payer: COMMERCIAL

## 2020-08-06 DIAGNOSIS — M72.2 PLANTAR FASCIITIS OF RIGHT FOOT: Primary | ICD-10-CM

## 2020-08-06 PROCEDURE — 99213 OFFICE O/P EST LOW 20 MIN: CPT | Performed by: PODIATRIST

## 2020-08-07 NOTE — PROGRESS NOTES
HPI:    Patient ID: Judy Mane is a 69year old female. 69-year-old female presents to the office today having not been seen in almost 2 years. Patient has a new complaint in the right foot.   A few weeks ago patient felt a point tender spot in the rig nor any signs of weakness. She brought a copy of the x-ray previously taken and I was unable to look at it on this date and therefore I looked at it the next day and gave this patient a call.   It is my sense that the x-ray was negative for bone pathology

## 2020-10-15 ENCOUNTER — HOSPITAL ENCOUNTER (OUTPATIENT)
Dept: GENERAL RADIOLOGY | Facility: HOSPITAL | Age: 70
Discharge: HOME | End: 2020-10-15
Attending: PHYSICIAN ASSISTANT
Payer: COMMERCIAL

## 2020-10-15 DIAGNOSIS — Z98.890 S/P RIGHT ROTATOR CUFF REPAIR: ICD-10-CM

## 2020-10-15 PROCEDURE — 73030 X-RAY EXAM OF SHOULDER: CPT | Performed by: PHYSICIAN ASSISTANT

## 2020-10-22 ENCOUNTER — HOSPITAL ENCOUNTER (OUTPATIENT)
Dept: GENERAL RADIOLOGY | Facility: HOSPITAL | Age: 70
Discharge: HOME | End: 2020-10-22
Attending: PHYSICIAN ASSISTANT
Payer: COMMERCIAL

## 2020-10-22 ENCOUNTER — HOSPITAL ENCOUNTER (OUTPATIENT)
Dept: MRI IMAGING | Facility: HOSPITAL | Age: 70
Discharge: HOME | End: 2020-10-22
Attending: PHYSICIAN ASSISTANT
Payer: COMMERCIAL

## 2020-10-22 DIAGNOSIS — Z98.890 S/P RIGHT ROTATOR CUFF REPAIR: ICD-10-CM

## 2020-10-22 PROCEDURE — 77002 NEEDLE LOCALIZATION BY XRAY: CPT | Performed by: PHYSICIAN ASSISTANT

## 2020-10-22 PROCEDURE — 73222 MRI JOINT UPR EXTREM W/DYE: CPT | Performed by: PHYSICIAN ASSISTANT

## 2020-10-22 PROCEDURE — 23350 INJECTION FOR SHOULDER X-RAY: CPT | Performed by: PHYSICIAN ASSISTANT

## 2020-10-22 PROCEDURE — A9575 INJ GADOTERATE MEGLUMI 0.1ML: HCPCS | Performed by: PHYSICIAN ASSISTANT

## 2020-10-22 RX ORDER — LIDOCAINE HYDROCHLORIDE 10 MG/ML
INJECTION, SOLUTION EPIDURAL; INFILTRATION; INTRACAUDAL; PERINEURAL
Status: COMPLETED
Start: 2020-10-22 | End: 2020-10-22

## 2020-10-22 RX ORDER — LIDOCAINE HYDROCHLORIDE 10 MG/ML
10 INJECTION, SOLUTION EPIDURAL; INFILTRATION; INTRACAUDAL; PERINEURAL ONCE
Status: COMPLETED | OUTPATIENT
Start: 2020-10-22 | End: 2020-10-22

## 2020-10-22 RX ADMIN — LIDOCAINE HYDROCHLORIDE 10 ML: 10 INJECTION, SOLUTION EPIDURAL; INFILTRATION; INTRACAUDAL; PERINEURAL at 10:57:00

## 2021-03-09 DIAGNOSIS — Z98.890 S/P RIGHT ROTATOR CUFF REPAIR: Primary | ICD-10-CM

## 2021-03-11 ENCOUNTER — HOSPITAL ENCOUNTER (OUTPATIENT)
Dept: PHYSICAL MEDICINE AND REHAB | Age: 71
Discharge: STILL ADMITTED OR ADMINISTRATIVE | End: 2021-03-11

## 2021-03-11 PROCEDURE — 97161 PT EVAL LOW COMPLEX 20 MIN: CPT | Performed by: PHYSICAL THERAPIST

## 2021-03-11 PROCEDURE — 97530 THERAPEUTIC ACTIVITIES: CPT | Performed by: PHYSICAL THERAPIST

## 2021-03-11 ASSESSMENT — ENCOUNTER SYMPTOMS
QUALITY: SORE
PAIN LOCATION: R SHOULDER
ALLEVIATING FACTORS: PRESCRIPTION MEDICATIONS
QUALITY: SHARP
PAIN FREQUENCY: CONSTANT
SUBJECTIVE PAIN PROGRESSION: IMPROVED
ALLEVIATING FACTORS: ICE
QUALITY: DISCOMFORT
PAIN SCALE AT HIGHEST: 8
PAIN SEVERITY NOW: 2
ALLEVIATING FACTORS: REST
PAIN SCALE AT LOWEST: 2

## 2021-03-15 ENCOUNTER — HOSPITAL ENCOUNTER (OUTPATIENT)
Dept: PHYSICAL MEDICINE AND REHAB | Age: 71
Discharge: STILL ADMITTED OR ADMINISTRATIVE | End: 2021-03-15

## 2021-03-15 PROCEDURE — 97140 MANUAL THERAPY 1/> REGIONS: CPT | Performed by: PHYSICAL THERAPY ASSISTANT

## 2021-03-15 PROCEDURE — 97110 THERAPEUTIC EXERCISES: CPT | Performed by: PHYSICAL THERAPY ASSISTANT

## 2021-03-17 ENCOUNTER — HOSPITAL ENCOUNTER (OUTPATIENT)
Dept: PHYSICAL MEDICINE AND REHAB | Age: 71
Discharge: STILL ADMITTED OR ADMINISTRATIVE | End: 2021-03-17

## 2021-03-17 PROCEDURE — 97140 MANUAL THERAPY 1/> REGIONS: CPT | Performed by: PHYSICAL THERAPY ASSISTANT

## 2021-03-17 PROCEDURE — 97110 THERAPEUTIC EXERCISES: CPT | Performed by: PHYSICAL THERAPY ASSISTANT

## 2021-03-19 ENCOUNTER — HOSPITAL ENCOUNTER (OUTPATIENT)
Dept: PHYSICAL MEDICINE AND REHAB | Age: 71
Discharge: STILL ADMITTED OR ADMINISTRATIVE | End: 2021-03-19

## 2021-03-19 PROCEDURE — 97110 THERAPEUTIC EXERCISES: CPT | Performed by: PHYSICAL THERAPY ASSISTANT

## 2021-03-19 PROCEDURE — 97140 MANUAL THERAPY 1/> REGIONS: CPT | Performed by: PHYSICAL THERAPY ASSISTANT

## 2021-03-22 ENCOUNTER — HOSPITAL ENCOUNTER (OUTPATIENT)
Dept: PHYSICAL MEDICINE AND REHAB | Age: 71
Discharge: STILL ADMITTED OR ADMINISTRATIVE | End: 2021-03-22

## 2021-03-22 PROCEDURE — 97140 MANUAL THERAPY 1/> REGIONS: CPT | Performed by: PHYSICAL THERAPY ASSISTANT

## 2021-03-22 PROCEDURE — 97110 THERAPEUTIC EXERCISES: CPT | Performed by: PHYSICAL THERAPY ASSISTANT

## 2021-03-24 ENCOUNTER — HOSPITAL ENCOUNTER (OUTPATIENT)
Dept: PHYSICAL MEDICINE AND REHAB | Age: 71
Discharge: STILL ADMITTED OR ADMINISTRATIVE | End: 2021-03-24

## 2021-03-24 PROCEDURE — 97110 THERAPEUTIC EXERCISES: CPT | Performed by: PHYSICAL THERAPY ASSISTANT

## 2021-03-24 PROCEDURE — 97140 MANUAL THERAPY 1/> REGIONS: CPT | Performed by: PHYSICAL THERAPY ASSISTANT

## 2021-03-26 ENCOUNTER — HOSPITAL ENCOUNTER (OUTPATIENT)
Dept: PHYSICAL MEDICINE AND REHAB | Age: 71
Discharge: STILL ADMITTED OR ADMINISTRATIVE | End: 2021-03-26

## 2021-03-26 PROCEDURE — 97140 MANUAL THERAPY 1/> REGIONS: CPT | Performed by: PHYSICAL THERAPY ASSISTANT

## 2021-03-26 PROCEDURE — 97110 THERAPEUTIC EXERCISES: CPT | Performed by: PHYSICAL THERAPY ASSISTANT

## 2021-03-26 ASSESSMENT — ENCOUNTER SYMPTOMS: PAIN SEVERITY NOW: 3

## 2021-03-29 ENCOUNTER — HOSPITAL ENCOUNTER (OUTPATIENT)
Dept: PHYSICAL MEDICINE AND REHAB | Age: 71
Discharge: STILL ADMITTED OR ADMINISTRATIVE | End: 2021-03-29
Attending: INTERNAL MEDICINE

## 2021-03-29 PROCEDURE — 97140 MANUAL THERAPY 1/> REGIONS: CPT | Performed by: PHYSICAL THERAPY ASSISTANT

## 2021-03-29 PROCEDURE — 97110 THERAPEUTIC EXERCISES: CPT | Performed by: PHYSICAL THERAPY ASSISTANT

## 2021-03-31 ENCOUNTER — HOSPITAL ENCOUNTER (OUTPATIENT)
Dept: PHYSICAL MEDICINE AND REHAB | Age: 71
Discharge: STILL ADMITTED OR ADMINISTRATIVE | End: 2021-03-31

## 2021-03-31 PROCEDURE — 97110 THERAPEUTIC EXERCISES: CPT | Performed by: PHYSICAL THERAPY ASSISTANT

## 2021-03-31 PROCEDURE — 97140 MANUAL THERAPY 1/> REGIONS: CPT | Performed by: PHYSICAL THERAPY ASSISTANT

## 2021-04-02 ENCOUNTER — HOSPITAL ENCOUNTER (OUTPATIENT)
Dept: PHYSICAL MEDICINE AND REHAB | Age: 71
Discharge: STILL ADMITTED OR ADMINISTRATIVE | End: 2021-04-02

## 2021-04-02 PROCEDURE — 97140 MANUAL THERAPY 1/> REGIONS: CPT | Performed by: PHYSICAL THERAPY ASSISTANT

## 2021-04-02 PROCEDURE — 97110 THERAPEUTIC EXERCISES: CPT | Performed by: PHYSICAL THERAPY ASSISTANT

## 2021-04-06 ENCOUNTER — HOSPITAL ENCOUNTER (OUTPATIENT)
Dept: PHYSICAL MEDICINE AND REHAB | Age: 71
Discharge: STILL ADMITTED OR ADMINISTRATIVE | End: 2021-04-06

## 2021-04-06 PROCEDURE — 97110 THERAPEUTIC EXERCISES: CPT | Performed by: PHYSICAL THERAPY ASSISTANT

## 2021-04-06 PROCEDURE — 97140 MANUAL THERAPY 1/> REGIONS: CPT | Performed by: PHYSICAL THERAPY ASSISTANT

## 2021-04-06 ASSESSMENT — ENCOUNTER SYMPTOMS: PAIN SEVERITY NOW: 4

## 2021-04-07 ENCOUNTER — HOSPITAL ENCOUNTER (OUTPATIENT)
Dept: PHYSICAL MEDICINE AND REHAB | Age: 71
Discharge: STILL ADMITTED OR ADMINISTRATIVE | End: 2021-04-07

## 2021-04-07 PROCEDURE — 97140 MANUAL THERAPY 1/> REGIONS: CPT | Performed by: PHYSICAL THERAPY ASSISTANT

## 2021-04-07 PROCEDURE — 97110 THERAPEUTIC EXERCISES: CPT | Performed by: PHYSICAL THERAPY ASSISTANT

## 2021-04-08 ENCOUNTER — APPOINTMENT (OUTPATIENT)
Dept: PHYSICAL MEDICINE AND REHAB | Age: 71
End: 2021-04-08

## 2021-04-09 ENCOUNTER — APPOINTMENT (OUTPATIENT)
Dept: PHYSICAL MEDICINE AND REHAB | Age: 71
End: 2021-04-09

## 2021-04-12 ENCOUNTER — HOSPITAL ENCOUNTER (OUTPATIENT)
Dept: PHYSICAL MEDICINE AND REHAB | Age: 71
Discharge: STILL ADMITTED OR ADMINISTRATIVE | End: 2021-04-12

## 2021-04-12 PROCEDURE — 97140 MANUAL THERAPY 1/> REGIONS: CPT | Performed by: PHYSICAL THERAPY ASSISTANT

## 2021-04-12 PROCEDURE — 97110 THERAPEUTIC EXERCISES: CPT | Performed by: PHYSICAL THERAPY ASSISTANT

## 2021-04-15 ENCOUNTER — HOSPITAL ENCOUNTER (OUTPATIENT)
Dept: PHYSICAL MEDICINE AND REHAB | Age: 71
Discharge: STILL ADMITTED OR ADMINISTRATIVE | End: 2021-04-15

## 2021-04-15 PROCEDURE — 97035 APP MDLTY 1+ULTRASOUND EA 15: CPT | Performed by: PHYSICAL THERAPIST

## 2021-04-15 PROCEDURE — 97140 MANUAL THERAPY 1/> REGIONS: CPT | Performed by: PHYSICAL THERAPIST

## 2021-04-15 ASSESSMENT — ENCOUNTER SYMPTOMS
PAIN LOCATION: RIGHT ANTERIOR SHOULDER
QUALITY: DISCOMFORT
QUALITY: SORE
SUBJECTIVE PAIN PROGRESSION: NO CHANGE
QUALITY: TIGHT
ALLEVIATING FACTORS: AVOIDING MOVEMENT IN INVOLVED AREA
PAIN FREQUENCY: CONSTANT
QUALITY: ACHE

## 2021-04-16 ENCOUNTER — APPOINTMENT (OUTPATIENT)
Dept: PHYSICAL MEDICINE AND REHAB | Age: 71
End: 2021-04-16

## 2021-04-20 ENCOUNTER — HOSPITAL ENCOUNTER (OUTPATIENT)
Dept: PHYSICAL MEDICINE AND REHAB | Age: 71
Discharge: STILL ADMITTED OR ADMINISTRATIVE | End: 2021-04-20
Attending: INTERNAL MEDICINE

## 2021-04-20 PROCEDURE — 97035 APP MDLTY 1+ULTRASOUND EA 15: CPT | Performed by: PHYSICAL THERAPIST

## 2021-04-20 PROCEDURE — 97140 MANUAL THERAPY 1/> REGIONS: CPT | Performed by: PHYSICAL THERAPIST

## 2021-04-20 ASSESSMENT — ENCOUNTER SYMPTOMS
SUBJECTIVE PAIN PROGRESSION: IMPROVED
PAIN SEVERITY NOW: 1
QUALITY: ACHE
ALLEVIATING FACTORS: AVOIDING MOVEMENT IN INVOLVED AREA
ALLEVIATING FACTORS: CHANGE IN POSITION
QUALITY: DISCOMFORT
PAIN SCALE AT LOWEST: 0

## 2021-04-22 ENCOUNTER — HOSPITAL ENCOUNTER (OUTPATIENT)
Dept: PHYSICAL MEDICINE AND REHAB | Age: 71
Discharge: STILL ADMITTED OR ADMINISTRATIVE | End: 2021-04-22

## 2021-04-22 PROCEDURE — 97140 MANUAL THERAPY 1/> REGIONS: CPT | Performed by: PHYSICAL THERAPIST

## 2021-04-22 ASSESSMENT — ENCOUNTER SYMPTOMS
PAIN SEVERITY NOW: 2
ALLEVIATING FACTORS: AVOIDING MOVEMENT IN INVOLVED AREA
PAIN SCALE AT LOWEST: 0
PAIN LOCATION: RIGHT SHOULDER
PAIN FREQUENCY: INTERMITTENT
SUBJECTIVE PAIN PROGRESSION: IMPROVED
ALLEVIATING FACTORS: CHANGE IN POSITION
QUALITY: ACHE

## 2021-04-27 ENCOUNTER — HOSPITAL ENCOUNTER (OUTPATIENT)
Dept: PHYSICAL MEDICINE AND REHAB | Age: 71
Discharge: STILL ADMITTED OR ADMINISTRATIVE | End: 2021-04-27

## 2021-04-27 PROCEDURE — 97035 APP MDLTY 1+ULTRASOUND EA 15: CPT | Performed by: PHYSICAL THERAPIST

## 2021-04-27 PROCEDURE — 97140 MANUAL THERAPY 1/> REGIONS: CPT | Performed by: PHYSICAL THERAPIST

## 2021-04-27 ASSESSMENT — ENCOUNTER SYMPTOMS
PAIN SEVERITY NOW: 3
PAIN FREQUENCY: INTERMITTENT

## 2021-04-29 ENCOUNTER — TELEPHONE (OUTPATIENT)
Dept: PHYSICAL MEDICINE AND REHAB | Age: 71
End: 2021-04-29

## 2021-04-29 ENCOUNTER — APPOINTMENT (OUTPATIENT)
Dept: PHYSICAL MEDICINE AND REHAB | Age: 71
End: 2021-04-29

## 2021-05-04 ENCOUNTER — HOSPITAL ENCOUNTER (OUTPATIENT)
Dept: PHYSICAL MEDICINE AND REHAB | Age: 71
Discharge: STILL ADMITTED OR ADMINISTRATIVE | End: 2021-05-04
Attending: INTERNAL MEDICINE

## 2021-05-04 PROCEDURE — 97035 APP MDLTY 1+ULTRASOUND EA 15: CPT | Performed by: PHYSICAL THERAPIST

## 2021-05-04 PROCEDURE — 97110 THERAPEUTIC EXERCISES: CPT | Performed by: PHYSICAL THERAPIST

## 2021-05-04 PROCEDURE — 97140 MANUAL THERAPY 1/> REGIONS: CPT | Performed by: PHYSICAL THERAPIST

## 2021-05-04 ASSESSMENT — ENCOUNTER SYMPTOMS
QUALITY: DISCOMFORT
PAIN SEVERITY NOW: 7
ALLEVIATING FACTORS: AVOIDING MOVEMENT IN INVOLVED AREA
PAIN SCALE AT LOWEST: 2
PAIN SCALE AT HIGHEST: 8
QUALITY: ACHE
SUBJECTIVE PAIN PROGRESSION: NO CHANGE
QUALITY: SHARP
PAIN LOCATION: RIGHT SHOULDER

## 2021-05-06 ENCOUNTER — APPOINTMENT (OUTPATIENT)
Dept: PHYSICAL MEDICINE AND REHAB | Age: 71
End: 2021-05-06
Attending: INTERNAL MEDICINE

## 2021-05-16 ENCOUNTER — APPOINTMENT (OUTPATIENT)
Dept: GENERAL RADIOLOGY | Age: 71
End: 2021-05-16
Attending: EMERGENCY MEDICINE

## 2021-05-16 ENCOUNTER — HOSPITAL ENCOUNTER (EMERGENCY)
Age: 71
Discharge: HOME | End: 2021-05-16
Attending: EMERGENCY MEDICINE

## 2021-05-16 VITALS
BODY MASS INDEX: 35.62 KG/M2 | WEIGHT: 193.56 LBS | HEART RATE: 82 BPM | HEIGHT: 62 IN | RESPIRATION RATE: 16 BRPM | DIASTOLIC BLOOD PRESSURE: 76 MMHG | TEMPERATURE: 98.8 F | SYSTOLIC BLOOD PRESSURE: 143 MMHG | OXYGEN SATURATION: 98 %

## 2021-05-16 DIAGNOSIS — I47.19 ATRIAL TACHYCARDIA, PAROXYSMAL: ICD-10-CM

## 2021-05-16 DIAGNOSIS — R00.2 RAPID PALPITATIONS: Primary | ICD-10-CM

## 2021-05-16 PROBLEM — R10.84 GENERALIZED ABDOMINAL PAIN: Status: ACTIVE | Noted: 2021-05-16

## 2021-05-16 PROBLEM — R11.15 CYCLIC VOMITING SYNDROME: Status: ACTIVE | Noted: 2019-07-15

## 2021-05-16 PROBLEM — Z87.11 H/O GASTRIC ULCER: Status: ACTIVE | Noted: 2021-05-16

## 2021-05-16 PROBLEM — Z86.010 HISTORY OF COLON POLYPS: Status: ACTIVE | Noted: 2019-08-08

## 2021-05-16 PROBLEM — D89.1 CRYOGLOBULINEMIA (CMD): Status: ACTIVE | Noted: 2020-03-11

## 2021-05-16 PROBLEM — M65.4 RADIAL STYLOID TENOSYNOVITIS OF LEFT HAND: Status: ACTIVE | Noted: 2017-03-10

## 2021-05-16 PROBLEM — Z86.0100 HISTORY OF COLON POLYPS: Status: ACTIVE | Noted: 2019-08-08

## 2021-05-16 PROBLEM — K43.9 VENTRAL HERNIA WITHOUT OBSTRUCTION OR GANGRENE: Status: ACTIVE | Noted: 2021-05-16

## 2021-05-16 PROBLEM — M51.369 DEGENERATION OF LUMBAR INTERVERTEBRAL DISC: Status: ACTIVE | Noted: 2019-09-10

## 2021-05-16 PROBLEM — M25.561 CHRONIC PAIN OF BOTH KNEES: Status: ACTIVE | Noted: 2017-06-30

## 2021-05-16 PROBLEM — R42 DIZZINESS AND GIDDINESS: Status: ACTIVE | Noted: 2021-05-16

## 2021-05-16 PROBLEM — E78.00 SERUM CHOLESTEROL ELEVATED: Status: ACTIVE | Noted: 2021-05-16

## 2021-05-16 PROBLEM — R55 FAINTING EPISODES: Status: ACTIVE | Noted: 2021-05-16

## 2021-05-16 PROBLEM — Z92.89 HISTORY OF BLOOD TRANSFUSION: Status: ACTIVE | Noted: 2021-05-16

## 2021-05-16 PROBLEM — L57.0 ACTINIC KERATOSIS: Status: ACTIVE | Noted: 2020-08-06

## 2021-05-16 PROBLEM — F43.23 ADJUSTMENT DISORDER WITH MIXED ANXIETY AND DEPRESSED MOOD: Status: ACTIVE | Noted: 2021-05-16

## 2021-05-16 PROBLEM — M75.111 NONTRAUMATIC INCOMPLETE TEAR OF RIGHT ROTATOR CUFF: Status: ACTIVE | Noted: 2021-01-28

## 2021-05-16 PROBLEM — F11.20 UNCOMPLICATED OPIOID DEPENDENCE  (CMD): Status: ACTIVE | Noted: 2019-07-19

## 2021-05-16 PROBLEM — D47.2 IGM LAMBDA PARAPROTEINEMIA: Status: ACTIVE | Noted: 2019-04-29

## 2021-05-16 PROBLEM — Z96.653 STATUS POST TOTAL BILATERAL KNEE REPLACEMENT: Status: ACTIVE | Noted: 2017-06-30

## 2021-05-16 PROBLEM — M47.899 FACET SYNDROME: Status: ACTIVE | Noted: 2019-09-10

## 2021-05-16 PROBLEM — D68.00: Status: ACTIVE | Noted: 2021-05-16

## 2021-05-16 PROBLEM — Z86.59 H/O: DEPRESSION: Status: ACTIVE | Noted: 2021-05-16

## 2021-05-16 PROBLEM — M65.30 TRIGGERING OF DIGIT: Status: ACTIVE | Noted: 2017-03-10

## 2021-05-16 PROBLEM — R55 SYNCOPE AND COLLAPSE: Status: ACTIVE | Noted: 2021-05-16

## 2021-05-16 PROBLEM — F41.9 ANXIETY DISORDER, UNSPECIFIED: Status: ACTIVE | Noted: 2019-07-19

## 2021-05-16 PROBLEM — M51.36 DEGENERATION OF LUMBAR INTERVERTEBRAL DISC: Status: ACTIVE | Noted: 2019-09-10

## 2021-05-16 PROBLEM — F11.20 NARCOTIC DEPENDENCE  (CMD): Status: ACTIVE | Noted: 2021-05-16

## 2021-05-16 PROBLEM — Z96.653 HISTORY OF TOTAL BILATERAL KNEE REPLACEMENT: Status: ACTIVE | Noted: 2019-09-26

## 2021-05-16 PROBLEM — M25.562 CHRONIC PAIN OF BOTH KNEES: Status: ACTIVE | Noted: 2017-06-30

## 2021-05-16 PROBLEM — M79.7 FIBROMYALGIA: Status: ACTIVE | Noted: 2021-05-16

## 2021-05-16 PROBLEM — G89.18 ACUTE POSTOPERATIVE PAIN: Status: ACTIVE | Noted: 2021-05-16

## 2021-05-16 PROBLEM — H93.19 TINNITUS: Status: ACTIVE | Noted: 2021-05-16

## 2021-05-16 PROBLEM — D62 ANEMIA ASSOCIATED WITH ACUTE BLOOD LOSS: Status: ACTIVE | Noted: 2021-05-16

## 2021-05-16 PROBLEM — G89.29 CHRONIC PAIN OF BOTH KNEES: Status: ACTIVE | Noted: 2017-06-30

## 2021-05-16 PROBLEM — R11.2 INTRACTABLE VOMITING WITH NAUSEA: Status: ACTIVE | Noted: 2019-07-15

## 2021-05-16 PROBLEM — G89.4 CHRONIC PAIN DISORDER: Status: ACTIVE | Noted: 2019-09-10

## 2021-05-16 PROBLEM — M18.0 PRIMARY OSTEOARTHRITIS OF BOTH FIRST CARPOMETACARPAL JOINTS: Status: ACTIVE | Noted: 2017-03-27

## 2021-05-16 LAB
ALBUMIN SERPL-MCNC: 3.6 G/DL (ref 3.6–5.1)
ALBUMIN/GLOB SERPL: 1 {RATIO} (ref 1–2.4)
ALP SERPL-CCNC: 121 UNITS/L (ref 45–117)
ALT SERPL-CCNC: 31 UNITS/L
ANION GAP SERPL CALC-SCNC: 9 MMOL/L (ref 10–20)
APTT PPP: 25 SEC (ref 22–30)
AST SERPL-CCNC: 20 UNITS/L
ATRIAL RATE (BPM): 70
BASOPHILS # BLD: 0.1 K/MCL (ref 0–0.3)
BASOPHILS NFR BLD: 1 %
BILIRUB SERPL-MCNC: 0.4 MG/DL (ref 0.2–1)
BUN SERPL-MCNC: 15 MG/DL (ref 6–20)
BUN/CREAT SERPL: 20 (ref 7–25)
CALCIUM SERPL-MCNC: 9 MG/DL (ref 8.4–10.2)
CHLORIDE SERPL-SCNC: 109 MMOL/L (ref 98–107)
CO2 SERPL-SCNC: 31 MMOL/L (ref 21–32)
CREAT SERPL-MCNC: 0.74 MG/DL (ref 0.51–0.95)
D DIMER PPP FEU-MCNC: 0.55 MG/L (FEU)
DEPRECATED RDW RBC: 39.8 FL (ref 39–50)
EOSINOPHIL # BLD: 0.1 K/MCL (ref 0–0.5)
EOSINOPHIL NFR BLD: 1 %
ERYTHROCYTE [DISTWIDTH] IN BLOOD: 12.1 % (ref 11–15)
FASTING DURATION TIME PATIENT: ABNORMAL H
GFR SERPLBLD BASED ON 1.73 SQ M-ARVRAT: 81 ML/MIN/1.73M2
GLOBULIN SER-MCNC: 3.7 G/DL (ref 2–4)
GLUCOSE SERPL-MCNC: 126 MG/DL (ref 65–99)
HCT VFR BLD CALC: 45.4 % (ref 36–46.5)
HGB BLD-MCNC: 15.2 G/DL (ref 12–15.5)
IMM GRANULOCYTES # BLD AUTO: 0.1 K/MCL (ref 0–0.2)
IMM GRANULOCYTES # BLD: 1 %
INR PPP: 1
LYMPHOCYTES # BLD: 2.6 K/MCL (ref 1–4)
LYMPHOCYTES NFR BLD: 24 %
MAGNESIUM SERPL-MCNC: 2.2 MG/DL (ref 1.7–2.4)
MCH RBC QN AUTO: 30.4 PG (ref 26–34)
MCHC RBC AUTO-ENTMCNC: 33.5 G/DL (ref 32–36.5)
MCV RBC AUTO: 90.8 FL (ref 78–100)
MONOCYTES # BLD: 0.8 K/MCL (ref 0.3–0.9)
MONOCYTES NFR BLD: 7 %
NEUTROPHILS # BLD: 7.4 K/MCL (ref 1.8–7.7)
NEUTROPHILS NFR BLD: 66 %
NRBC BLD MANUAL-RTO: 0 /100 WBC
NT-PROBNP SERPL-MCNC: 173 PG/ML
P AXIS (DEGREES): 52
PLATELET # BLD AUTO: 190 K/MCL (ref 140–450)
POTASSIUM SERPL-SCNC: 3.6 MMOL/L (ref 3.4–5.1)
PR-INTERVAL (MSEC): 150
PROT SERPL-MCNC: 7.3 G/DL (ref 6.4–8.2)
PROTHROMBIN TIME: 10.4 SEC (ref 9.7–11.8)
QRS-INTERVAL (MSEC): 80
QT-INTERVAL (MSEC): 392
QTC: 423
R AXIS (DEGREES): 66
RBC # BLD: 5 MIL/MCL (ref 4–5.2)
REPORT TEXT: NORMAL
SODIUM SERPL-SCNC: 145 MMOL/L (ref 135–145)
T AXIS (DEGREES): 58
TROPONIN I SERPL HS-MCNC: <0.02 NG/ML
TROPONIN I SERPL HS-MCNC: <0.02 NG/ML
TSH SERPL-ACNC: 0.98 MCUNITS/ML (ref 0.35–5)
VENTRICULAR RATE EKG/MIN (BPM): 70
WBC # BLD: 11 K/MCL (ref 4.2–11)

## 2021-05-16 PROCEDURE — 84443 ASSAY THYROID STIM HORMONE: CPT | Performed by: EMERGENCY MEDICINE

## 2021-05-16 PROCEDURE — 83735 ASSAY OF MAGNESIUM: CPT | Performed by: EMERGENCY MEDICINE

## 2021-05-16 PROCEDURE — 10002801 HB RX 250 W/O HCPCS: Performed by: EMERGENCY MEDICINE

## 2021-05-16 PROCEDURE — 96374 THER/PROPH/DIAG INJ IV PUSH: CPT

## 2021-05-16 PROCEDURE — 85379 FIBRIN DEGRADATION QUANT: CPT | Performed by: EMERGENCY MEDICINE

## 2021-05-16 PROCEDURE — 93005 ELECTROCARDIOGRAM TRACING: CPT | Performed by: EMERGENCY MEDICINE

## 2021-05-16 PROCEDURE — 96375 TX/PRO/DX INJ NEW DRUG ADDON: CPT

## 2021-05-16 PROCEDURE — 10002800 HB RX 250 W HCPCS: Performed by: EMERGENCY MEDICINE

## 2021-05-16 PROCEDURE — 80050 GENERAL HEALTH PANEL: CPT | Performed by: EMERGENCY MEDICINE

## 2021-05-16 PROCEDURE — 96376 TX/PRO/DX INJ SAME DRUG ADON: CPT

## 2021-05-16 PROCEDURE — 84484 ASSAY OF TROPONIN QUANT: CPT | Performed by: EMERGENCY MEDICINE

## 2021-05-16 PROCEDURE — 71045 X-RAY EXAM CHEST 1 VIEW: CPT

## 2021-05-16 PROCEDURE — 83880 ASSAY OF NATRIURETIC PEPTIDE: CPT | Performed by: EMERGENCY MEDICINE

## 2021-05-16 PROCEDURE — 85730 THROMBOPLASTIN TIME PARTIAL: CPT | Performed by: EMERGENCY MEDICINE

## 2021-05-16 PROCEDURE — 99285 EMERGENCY DEPT VISIT HI MDM: CPT

## 2021-05-16 PROCEDURE — 80053 COMPREHEN METABOLIC PANEL: CPT | Performed by: EMERGENCY MEDICINE

## 2021-05-16 PROCEDURE — 85610 PROTHROMBIN TIME: CPT | Performed by: EMERGENCY MEDICINE

## 2021-05-16 PROCEDURE — 85025 COMPLETE CBC W/AUTO DIFF WBC: CPT | Performed by: EMERGENCY MEDICINE

## 2021-05-16 RX ORDER — OMEPRAZOLE 20 MG/1
1 CAPSULE, DELAYED RELEASE ORAL DAILY
COMMUNITY
End: 2021-09-27 | Stop reason: ALTCHOICE

## 2021-05-16 RX ORDER — LORAZEPAM 2 MG/ML
1 INJECTION INTRAMUSCULAR ONCE
Status: COMPLETED | OUTPATIENT
Start: 2021-05-16 | End: 2021-05-16

## 2021-05-16 RX ORDER — TRAZODONE HYDROCHLORIDE 150 MG/1
1 TABLET ORAL AT BEDTIME
COMMUNITY
Start: 2021-04-28

## 2021-05-16 RX ORDER — ADENOSINE 3 MG/ML
6 INJECTION, SOLUTION INTRAVENOUS ONCE
Status: COMPLETED | OUTPATIENT
Start: 2021-05-16 | End: 2021-05-16

## 2021-05-16 RX ORDER — ATORVASTATIN CALCIUM 40 MG/1
40 TABLET, FILM COATED ORAL DAILY
COMMUNITY

## 2021-05-16 RX ORDER — ALPRAZOLAM 0.25 MG/1
0.25 TABLET ORAL NIGHTLY PRN
Status: ON HOLD | COMMUNITY
Start: 2021-04-28 | End: 2022-01-22

## 2021-05-16 RX ORDER — DICLOFENAC SODIUM 75 MG/1
TABLET, DELAYED RELEASE ORAL
COMMUNITY
Start: 2021-04-02 | End: 2021-05-16

## 2021-05-16 RX ORDER — METOPROLOL TARTRATE 1 MG/ML
5 INJECTION, SOLUTION INTRAVENOUS ONCE
Status: COMPLETED | OUTPATIENT
Start: 2021-05-16 | End: 2021-05-16

## 2021-05-16 RX ORDER — HYDROCODONE BITARTRATE AND ACETAMINOPHEN 10; 325 MG/1; MG/1
1 TABLET ORAL EVERY 6 HOURS PRN
COMMUNITY
Start: 2021-02-08

## 2021-05-16 RX ORDER — DULOXETIN HYDROCHLORIDE 60 MG/1
1 CAPSULE, DELAYED RELEASE ORAL DAILY
COMMUNITY
Start: 2021-05-03

## 2021-05-16 RX ADMIN — METOPROLOL TARTRATE 5 MG: 5 INJECTION INTRAVENOUS at 18:59

## 2021-05-16 RX ADMIN — ADENOSINE 6 MG: 3 INJECTION, SOLUTION INTRAVENOUS at 18:40

## 2021-05-16 RX ADMIN — METOPROLOL TARTRATE 5 MG: 5 INJECTION INTRAVENOUS at 18:50

## 2021-05-16 RX ADMIN — LORAZEPAM 1 MG: 2 INJECTION INTRAMUSCULAR; INTRAVENOUS at 18:59

## 2021-05-16 ASSESSMENT — ENCOUNTER SYMPTOMS
HEMATOLOGIC/LYMPHATIC NEGATIVE: 1
DIAPHORESIS: 0
FATIGUE: 0
DIZZINESS: 0
EYES NEGATIVE: 1
GASTROINTESTINAL NEGATIVE: 1
FEVER: 0
SHORTNESS OF BREATH: 1
ENDOCRINE NEGATIVE: 1

## 2021-05-16 ASSESSMENT — PAIN SCALES - GENERAL: PAINLEVEL_OUTOF10: 0

## 2021-05-17 LAB
ATRIAL RATE (BPM): 70
P AXIS (DEGREES): 52
PR-INTERVAL (MSEC): 150
QRS-INTERVAL (MSEC): 80
QT-INTERVAL (MSEC): 392
QTC: 423
R AXIS (DEGREES): 66
REPORT TEXT: NORMAL
T AXIS (DEGREES): 58
VENTRICULAR RATE EKG/MIN (BPM): 70

## 2021-09-27 ENCOUNTER — APPOINTMENT (OUTPATIENT)
Dept: CT IMAGING | Age: 71
End: 2021-09-27
Attending: EMERGENCY MEDICINE

## 2021-09-27 ENCOUNTER — APPOINTMENT (OUTPATIENT)
Dept: GENERAL RADIOLOGY | Age: 71
End: 2021-09-27
Attending: EMERGENCY MEDICINE

## 2021-09-27 ENCOUNTER — HOSPITAL ENCOUNTER (OUTPATIENT)
Age: 71
Setting detail: OBSERVATION
Discharge: HOME | End: 2021-09-28
Attending: EMERGENCY MEDICINE | Admitting: STUDENT IN AN ORGANIZED HEALTH CARE EDUCATION/TRAINING PROGRAM

## 2021-09-27 DIAGNOSIS — R06.02 SHORTNESS OF BREATH: Primary | ICD-10-CM

## 2021-09-27 DIAGNOSIS — R00.0 TACHYCARDIA: ICD-10-CM

## 2021-09-27 LAB
ALBUMIN SERPL-MCNC: 3.6 G/DL (ref 3.6–5.1)
ALBUMIN/GLOB SERPL: 1 {RATIO} (ref 1–2.4)
ALP SERPL-CCNC: 125 UNITS/L (ref 45–117)
ALT SERPL-CCNC: 60 UNITS/L
ANION GAP SERPL CALC-SCNC: 5 MMOL/L (ref 10–20)
AST SERPL-CCNC: 47 UNITS/L
BASOPHILS # BLD: 0 K/MCL (ref 0–0.3)
BASOPHILS NFR BLD: 1 %
BILIRUB SERPL-MCNC: 0.6 MG/DL (ref 0.2–1)
BUN SERPL-MCNC: 17 MG/DL (ref 6–20)
BUN/CREAT SERPL: 27 (ref 7–25)
CALCIUM SERPL-MCNC: 8.8 MG/DL (ref 8.4–10.2)
CHLORIDE SERPL-SCNC: 109 MMOL/L (ref 98–107)
CO2 SERPL-SCNC: 29 MMOL/L (ref 21–32)
CREAT SERPL-MCNC: 0.64 MG/DL (ref 0.51–0.95)
D DIMER PPP FEU-MCNC: 0.61 MG/L (FEU)
DEPRECATED RDW RBC: 39.6 FL (ref 39–50)
EOSINOPHIL # BLD: 0.1 K/MCL (ref 0–0.5)
EOSINOPHIL NFR BLD: 2 %
ERYTHROCYTE [DISTWIDTH] IN BLOOD: 11.9 % (ref 11–15)
FASTING DURATION TIME PATIENT: ABNORMAL H
GFR SERPLBLD BASED ON 1.73 SQ M-ARVRAT: 89 ML/MIN
GLOBULIN SER-MCNC: 3.6 G/DL (ref 2–4)
GLUCOSE SERPL-MCNC: 122 MG/DL (ref 70–99)
HCT VFR BLD CALC: 42.7 % (ref 36–46.5)
HGB BLD-MCNC: 14.2 G/DL (ref 12–15.5)
IMM GRANULOCYTES # BLD AUTO: 0 K/MCL (ref 0–0.2)
IMM GRANULOCYTES # BLD: 1 %
LYMPHOCYTES # BLD: 1.4 K/MCL (ref 1–4)
LYMPHOCYTES NFR BLD: 19 %
MAGNESIUM SERPL-MCNC: 2.1 MG/DL (ref 1.7–2.4)
MCH RBC QN AUTO: 30.1 PG (ref 26–34)
MCHC RBC AUTO-ENTMCNC: 33.3 G/DL (ref 32–36.5)
MCV RBC AUTO: 90.7 FL (ref 78–100)
MONOCYTES # BLD: 0.5 K/MCL (ref 0.3–0.9)
MONOCYTES NFR BLD: 7 %
NEUTROPHILS # BLD: 5.1 K/MCL (ref 1.8–7.7)
NEUTROPHILS NFR BLD: 70 %
NRBC BLD MANUAL-RTO: 0 /100 WBC
PLATELET # BLD AUTO: 180 K/MCL (ref 140–450)
POTASSIUM SERPL-SCNC: 3.8 MMOL/L (ref 3.4–5.1)
PROT SERPL-MCNC: 7.2 G/DL (ref 6.4–8.2)
RBC # BLD: 4.71 MIL/MCL (ref 4–5.2)
SARS-COV-2 RNA RESP QL NAA+PROBE: NOT DETECTED
SERVICE CMNT-IMP: NORMAL
SERVICE CMNT-IMP: NORMAL
SODIUM SERPL-SCNC: 139 MMOL/L (ref 135–145)
TROPONIN I SERPL HS-MCNC: <0.02 NG/ML
TSH SERPL-ACNC: 0.75 MCUNITS/ML (ref 0.35–5)
WBC # BLD: 7.3 K/MCL (ref 4.2–11)

## 2021-09-27 PROCEDURE — C9803 HOPD COVID-19 SPEC COLLECT: HCPCS

## 2021-09-27 PROCEDURE — 36415 COLL VENOUS BLD VENIPUNCTURE: CPT

## 2021-09-27 PROCEDURE — 71046 X-RAY EXAM CHEST 2 VIEWS: CPT

## 2021-09-27 PROCEDURE — 71275 CT ANGIOGRAPHY CHEST: CPT

## 2021-09-27 PROCEDURE — 80053 COMPREHEN METABOLIC PANEL: CPT | Performed by: EMERGENCY MEDICINE

## 2021-09-27 PROCEDURE — 93005 ELECTROCARDIOGRAM TRACING: CPT | Performed by: EMERGENCY MEDICINE

## 2021-09-27 PROCEDURE — 84443 ASSAY THYROID STIM HORMONE: CPT | Performed by: EMERGENCY MEDICINE

## 2021-09-27 PROCEDURE — 83735 ASSAY OF MAGNESIUM: CPT | Performed by: EMERGENCY MEDICINE

## 2021-09-27 PROCEDURE — 10004651 HB RX, NO CHARGE ITEM: Performed by: EMERGENCY MEDICINE

## 2021-09-27 PROCEDURE — 85025 COMPLETE CBC W/AUTO DIFF WBC: CPT | Performed by: EMERGENCY MEDICINE

## 2021-09-27 PROCEDURE — 99285 EMERGENCY DEPT VISIT HI MDM: CPT

## 2021-09-27 PROCEDURE — G0378 HOSPITAL OBSERVATION PER HR: HCPCS

## 2021-09-27 PROCEDURE — 10002805 HB CONTRAST AGENT: Performed by: EMERGENCY MEDICINE

## 2021-09-27 PROCEDURE — 85379 FIBRIN DEGRADATION QUANT: CPT | Performed by: EMERGENCY MEDICINE

## 2021-09-27 PROCEDURE — 87635 SARS-COV-2 COVID-19 AMP PRB: CPT | Performed by: EMERGENCY MEDICINE

## 2021-09-27 PROCEDURE — G1004 CDSM NDSC: HCPCS

## 2021-09-27 PROCEDURE — 84484 ASSAY OF TROPONIN QUANT: CPT | Performed by: EMERGENCY MEDICINE

## 2021-09-27 RX ORDER — HYDROCODONE BITARTRATE AND ACETAMINOPHEN 10; 325 MG/1; MG/1
1 TABLET ORAL EVERY 6 HOURS PRN
Status: DISCONTINUED | OUTPATIENT
Start: 2021-09-27 | End: 2021-09-28 | Stop reason: HOSPADM

## 2021-09-27 RX ORDER — ACETAMINOPHEN 500 MG
1000 TABLET ORAL ONCE
Status: COMPLETED | OUTPATIENT
Start: 2021-09-27 | End: 2021-09-27

## 2021-09-27 RX ORDER — METOPROLOL TARTRATE 1 MG/ML
5 INJECTION, SOLUTION INTRAVENOUS EVERY 6 HOURS PRN
Status: DISCONTINUED | OUTPATIENT
Start: 2021-09-27 | End: 2021-09-28 | Stop reason: HOSPADM

## 2021-09-27 RX ORDER — ONDANSETRON 2 MG/ML
4 INJECTION INTRAMUSCULAR; INTRAVENOUS EVERY 8 HOURS PRN
Status: DISCONTINUED | OUTPATIENT
Start: 2021-09-27 | End: 2021-09-28 | Stop reason: HOSPADM

## 2021-09-27 RX ORDER — PANTOPRAZOLE SODIUM 40 MG/1
40 TABLET, DELAYED RELEASE ORAL 2 TIMES DAILY
Status: DISCONTINUED | OUTPATIENT
Start: 2021-09-28 | End: 2021-09-28 | Stop reason: HOSPADM

## 2021-09-27 RX ORDER — HYDRALAZINE HYDROCHLORIDE 20 MG/ML
10 INJECTION INTRAMUSCULAR; INTRAVENOUS
Status: DISCONTINUED | OUTPATIENT
Start: 2021-09-27 | End: 2021-09-28 | Stop reason: HOSPADM

## 2021-09-27 RX ORDER — METOPROLOL SUCCINATE 50 MG/1
50 TABLET, EXTENDED RELEASE ORAL 2 TIMES DAILY
Status: ON HOLD | COMMUNITY
End: 2021-09-28 | Stop reason: HOSPADM

## 2021-09-27 RX ORDER — DULOXETIN HYDROCHLORIDE 60 MG/1
60 CAPSULE, DELAYED RELEASE ORAL DAILY
Status: DISCONTINUED | OUTPATIENT
Start: 2021-09-28 | End: 2021-09-28 | Stop reason: HOSPADM

## 2021-09-27 RX ORDER — METOPROLOL SUCCINATE 50 MG/1
50 TABLET, EXTENDED RELEASE ORAL 2 TIMES DAILY
Status: DISCONTINUED | OUTPATIENT
Start: 2021-09-28 | End: 2021-09-28

## 2021-09-27 RX ORDER — ALPRAZOLAM 0.25 MG/1
0.25 TABLET ORAL NIGHTLY PRN
Status: DISCONTINUED | OUTPATIENT
Start: 2021-09-27 | End: 2021-09-28 | Stop reason: HOSPADM

## 2021-09-27 RX ORDER — ATORVASTATIN CALCIUM 40 MG/1
40 TABLET, FILM COATED ORAL DAILY
Status: DISCONTINUED | OUTPATIENT
Start: 2021-09-28 | End: 2021-09-28 | Stop reason: HOSPADM

## 2021-09-27 RX ORDER — METOPROLOL TARTRATE 1 MG/ML
5 INJECTION, SOLUTION INTRAVENOUS ONCE
Status: DISCONTINUED | OUTPATIENT
Start: 2021-09-27 | End: 2021-09-28 | Stop reason: HOSPADM

## 2021-09-27 RX ORDER — PANTOPRAZOLE SODIUM 40 MG/1
40 TABLET, DELAYED RELEASE ORAL DAILY
Status: ON HOLD | COMMUNITY
End: 2022-01-26 | Stop reason: HOSPADM

## 2021-09-27 RX ORDER — ASPIRIN 325 MG
325 TABLET ORAL ONCE
Status: COMPLETED | OUTPATIENT
Start: 2021-09-27 | End: 2021-09-27

## 2021-09-27 RX ORDER — TRAZODONE HYDROCHLORIDE 50 MG/1
150 TABLET ORAL AT BEDTIME
Status: DISCONTINUED | OUTPATIENT
Start: 2021-09-28 | End: 2021-09-28

## 2021-09-27 RX ADMIN — IOHEXOL 50 ML: 350 INJECTION, SOLUTION INTRAVENOUS at 18:59

## 2021-09-27 RX ADMIN — ASPIRIN 325 MG ORAL TABLET 325 MG: 325 PILL ORAL at 17:59

## 2021-09-27 RX ADMIN — ACETAMINOPHEN 1000 MG: 500 TABLET ORAL at 22:19

## 2021-09-27 ASSESSMENT — ENCOUNTER SYMPTOMS
DIAPHORESIS: 0
SHORTNESS OF BREATH: 1
GASTROINTESTINAL NEGATIVE: 1
SEIZURES: 0
TREMORS: 0
FATIGUE: 1
HEADACHES: 0
FEVER: 0

## 2021-09-27 ASSESSMENT — PATIENT HEALTH QUESTIONNAIRE - PHQ9
CLINICAL INTERPRETATION OF PHQ2 SCORE: NO FURTHER SCREENING NEEDED
IS PATIENT ABLE TO COMPLETE PHQ2 OR PHQ9: YES

## 2021-09-28 ENCOUNTER — APPOINTMENT (OUTPATIENT)
Dept: CARDIOLOGY | Age: 71
End: 2021-09-28
Attending: SPECIALIST

## 2021-09-28 VITALS
HEART RATE: 59 BPM | SYSTOLIC BLOOD PRESSURE: 148 MMHG | BODY MASS INDEX: 34.49 KG/M2 | HEIGHT: 63 IN | DIASTOLIC BLOOD PRESSURE: 62 MMHG | WEIGHT: 194.67 LBS | OXYGEN SATURATION: 96 % | RESPIRATION RATE: 16 BRPM | TEMPERATURE: 97.7 F

## 2021-09-28 LAB
ALBUMIN SERPL-MCNC: 3.1 G/DL (ref 3.6–5.1)
ALBUMIN/GLOB SERPL: 1 {RATIO} (ref 1–2.4)
ALP SERPL-CCNC: 104 UNITS/L (ref 45–117)
ALT SERPL-CCNC: 47 UNITS/L
ANION GAP SERPL CALC-SCNC: 6 MMOL/L (ref 10–20)
AST SERPL-CCNC: 28 UNITS/L
ATRIAL RATE (BPM): 125
BASOPHILS # BLD: 0 K/MCL (ref 0–0.3)
BASOPHILS NFR BLD: 1 %
BILIRUB SERPL-MCNC: 0.4 MG/DL (ref 0.2–1)
BUN SERPL-MCNC: 17 MG/DL (ref 6–20)
BUN/CREAT SERPL: 27 (ref 7–25)
CALCIUM SERPL-MCNC: 8.7 MG/DL (ref 8.4–10.2)
CHLORIDE SERPL-SCNC: 111 MMOL/L (ref 98–107)
CO2 SERPL-SCNC: 28 MMOL/L (ref 21–32)
CREAT SERPL-MCNC: 0.63 MG/DL (ref 0.51–0.95)
DEPRECATED RDW RBC: 39.1 FL (ref 39–50)
EOSINOPHIL # BLD: 0.2 K/MCL (ref 0–0.5)
EOSINOPHIL NFR BLD: 3 %
ERYTHROCYTE [DISTWIDTH] IN BLOOD: 11.9 % (ref 11–15)
FASTING DURATION TIME PATIENT: ABNORMAL H
GFR SERPLBLD BASED ON 1.73 SQ M-ARVRAT: 90 ML/MIN
GLOBULIN SER-MCNC: 3 G/DL (ref 2–4)
GLUCOSE SERPL-MCNC: 116 MG/DL (ref 70–99)
HCT VFR BLD CALC: 39.2 % (ref 36–46.5)
HGB BLD-MCNC: 12.9 G/DL (ref 12–15.5)
IMM GRANULOCYTES # BLD AUTO: 0 K/MCL (ref 0–0.2)
IMM GRANULOCYTES # BLD: 0 %
LYMPHOCYTES # BLD: 1.8 K/MCL (ref 1–4)
LYMPHOCYTES NFR BLD: 27 %
MAGNESIUM SERPL-MCNC: 2.1 MG/DL (ref 1.7–2.4)
MCH RBC QN AUTO: 29.9 PG (ref 26–34)
MCHC RBC AUTO-ENTMCNC: 32.9 G/DL (ref 32–36.5)
MCV RBC AUTO: 90.7 FL (ref 78–100)
MONOCYTES # BLD: 0.6 K/MCL (ref 0.3–0.9)
MONOCYTES NFR BLD: 8 %
NEUTROPHILS # BLD: 4.2 K/MCL (ref 1.8–7.7)
NEUTROPHILS NFR BLD: 61 %
NRBC BLD MANUAL-RTO: 0 /100 WBC
NT-PROBNP SERPL-MCNC: 335 PG/ML
P AXIS (DEGREES): -23
PHOSPHATE SERPL-MCNC: 3.5 MG/DL (ref 2.4–4.7)
PLATELET # BLD AUTO: 154 K/MCL (ref 140–450)
POTASSIUM SERPL-SCNC: 3.4 MMOL/L (ref 3.4–5.1)
POTASSIUM SERPL-SCNC: 4.4 MMOL/L (ref 3.4–5.1)
PR-INTERVAL (MSEC): 166
PROT SERPL-MCNC: 6.1 G/DL (ref 6.4–8.2)
QRS-INTERVAL (MSEC): 78
QT-INTERVAL (MSEC): 302
QTC: 435
R AXIS (DEGREES): 57
RAINBOW EXTRA TUBES HOLD SPECIMEN: NORMAL
RBC # BLD: 4.32 MIL/MCL (ref 4–5.2)
REPORT TEXT: NORMAL
SODIUM SERPL-SCNC: 142 MMOL/L (ref 135–145)
T AXIS (DEGREES): 41
VENTRICULAR RATE EKG/MIN (BPM): 125
WBC # BLD: 6.9 K/MCL (ref 4.2–11)

## 2021-09-28 PROCEDURE — G0378 HOSPITAL OBSERVATION PER HR: HCPCS

## 2021-09-28 PROCEDURE — 83880 ASSAY OF NATRIURETIC PEPTIDE: CPT | Performed by: STUDENT IN AN ORGANIZED HEALTH CARE EDUCATION/TRAINING PROGRAM

## 2021-09-28 PROCEDURE — 36415 COLL VENOUS BLD VENIPUNCTURE: CPT | Performed by: STUDENT IN AN ORGANIZED HEALTH CARE EDUCATION/TRAINING PROGRAM

## 2021-09-28 PROCEDURE — 80053 COMPREHEN METABOLIC PANEL: CPT | Performed by: STUDENT IN AN ORGANIZED HEALTH CARE EDUCATION/TRAINING PROGRAM

## 2021-09-28 PROCEDURE — 84132 ASSAY OF SERUM POTASSIUM: CPT | Performed by: STUDENT IN AN ORGANIZED HEALTH CARE EDUCATION/TRAINING PROGRAM

## 2021-09-28 PROCEDURE — 10002803 HB RX 637: Performed by: STUDENT IN AN ORGANIZED HEALTH CARE EDUCATION/TRAINING PROGRAM

## 2021-09-28 PROCEDURE — 93306 TTE W/DOPPLER COMPLETE: CPT

## 2021-09-28 PROCEDURE — 85025 COMPLETE CBC W/AUTO DIFF WBC: CPT | Performed by: STUDENT IN AN ORGANIZED HEALTH CARE EDUCATION/TRAINING PROGRAM

## 2021-09-28 PROCEDURE — 84100 ASSAY OF PHOSPHORUS: CPT | Performed by: STUDENT IN AN ORGANIZED HEALTH CARE EDUCATION/TRAINING PROGRAM

## 2021-09-28 PROCEDURE — 83735 ASSAY OF MAGNESIUM: CPT | Performed by: STUDENT IN AN ORGANIZED HEALTH CARE EDUCATION/TRAINING PROGRAM

## 2021-09-28 RX ORDER — METOPROLOL SUCCINATE 50 MG/1
50 TABLET, EXTENDED RELEASE ORAL 2 TIMES DAILY
Status: DISCONTINUED | OUTPATIENT
Start: 2021-09-28 | End: 2021-09-28

## 2021-09-28 RX ORDER — TRAZODONE HYDROCHLORIDE 50 MG/1
150 TABLET ORAL AT BEDTIME
Status: DISCONTINUED | OUTPATIENT
Start: 2021-09-28 | End: 2021-09-28 | Stop reason: HOSPADM

## 2021-09-28 RX ORDER — POTASSIUM CHLORIDE 20 MEQ/1
40 TABLET, EXTENDED RELEASE ORAL ONCE
Status: DISCONTINUED | OUTPATIENT
Start: 2021-09-28 | End: 2021-09-28

## 2021-09-28 RX ORDER — METOPROLOL SUCCINATE 25 MG/1
25 TABLET, EXTENDED RELEASE ORAL 2 TIMES DAILY
Status: DISCONTINUED | OUTPATIENT
Start: 2021-09-28 | End: 2021-09-28 | Stop reason: HOSPADM

## 2021-09-28 RX ORDER — METOPROLOL SUCCINATE 25 MG/1
25 TABLET, EXTENDED RELEASE ORAL 2 TIMES DAILY
Qty: 60 TABLET | Refills: 0 | Status: ON HOLD | OUTPATIENT
Start: 2021-09-28 | End: 2022-01-26 | Stop reason: HOSPADM

## 2021-09-28 RX ORDER — POTASSIUM CHLORIDE 20 MEQ/1
40 TABLET, EXTENDED RELEASE ORAL ONCE
Status: COMPLETED | OUTPATIENT
Start: 2021-09-28 | End: 2021-09-28

## 2021-09-28 RX ADMIN — TRAZODONE HYDROCHLORIDE 150 MG: 50 TABLET ORAL at 01:37

## 2021-09-28 RX ADMIN — HYDROCODONE BITARTRATE AND ACETAMINOPHEN 1 TABLET: 10; 325 TABLET ORAL at 01:19

## 2021-09-28 RX ADMIN — PANTOPRAZOLE SODIUM 40 MG: 40 TABLET, DELAYED RELEASE ORAL at 08:33

## 2021-09-28 RX ADMIN — POTASSIUM CHLORIDE 40 MEQ: 1500 TABLET, EXTENDED RELEASE ORAL at 08:33

## 2021-09-28 RX ADMIN — DULOXETINE HYDROCHLORIDE 60 MG: 60 CAPSULE, DELAYED RELEASE ORAL at 09:12

## 2021-09-28 RX ADMIN — HYDROCODONE BITARTRATE AND ACETAMINOPHEN 1 TABLET: 10; 325 TABLET ORAL at 08:33

## 2021-09-28 RX ADMIN — METOPROLOL SUCCINATE 50 MG: 50 TABLET, EXTENDED RELEASE ORAL at 01:38

## 2021-09-28 RX ADMIN — METOPROLOL SUCCINATE 50 MG: 50 TABLET, EXTENDED RELEASE ORAL at 08:33

## 2021-09-28 RX ADMIN — HYDROCODONE BITARTRATE AND ACETAMINOPHEN 1 TABLET: 10; 325 TABLET ORAL at 14:51

## 2021-09-28 RX ADMIN — ATORVASTATIN CALCIUM 40 MG: 40 TABLET ORAL at 08:32

## 2021-09-28 ASSESSMENT — ACTIVITIES OF DAILY LIVING (ADL)
ADL_SHORT_OF_BREATH: NO
CHRONIC_PAIN_PRESENT: YES, CHRONIC
ADL_SCORE: 12
DESCRIBE HOW PAIN IMPACTS YOUR LIFE: NONE/CAN MANAGE
ADL_BEFORE_ADMISSION: INDEPENDENT
RECENT_DECLINE_ADL: NO

## 2021-09-28 ASSESSMENT — PAIN SCALES - GENERAL
PAINLEVEL_OUTOF10: 6
PAINLEVEL_OUTOF10: 7
PAINLEVEL_OUTOF10: 3
PAINLEVEL_OUTOF10: 0
PAINLEVEL_OUTOF10: 0

## 2021-09-28 ASSESSMENT — COGNITIVE AND FUNCTIONAL STATUS - GENERAL
BECAUSE OF A PHYSICAL, MENTAL, OR EMOTIONAL CONDITION, DO YOU HAVE DIFFICULTY DOING ERRANDS ALONE: NO
DO YOU HAVE SERIOUS DIFFICULTY WALKING OR CLIMBING STAIRS: NO
ARE YOU DEAF OR DO YOU HAVE SERIOUS DIFFICULTY  HEARING: NO
DO YOU HAVE DIFFICULTY DRESSING OR BATHING: NO
ARE YOU BLIND OR DO YOU HAVE SERIOUS DIFFICULTY SEEING, EVEN WHEN WEARING GLASSES: NO
BECAUSE OF A PHYSICAL, MENTAL, OR EMOTIONAL CONDITION, DO YOU HAVE SERIOUS DIFFICULTY CONCENTRATING, REMEMBERING OR MAKING DECISIONS: NO

## 2021-09-28 ASSESSMENT — LIFESTYLE VARIABLES
HOW OFTEN DO YOU HAVE A DRINK CONTAINING ALCOHOL: NEVER
AUDIT-C TOTAL SCORE: 0
HOW MANY STANDARD DRINKS CONTAINING ALCOHOL DO YOU HAVE ON A TYPICAL DAY: 0,1 OR 2
ALCOHOL_USE_STATUS: NO OR LOW RISK WITH VALIDATED TOOL
HOW OFTEN DO YOU HAVE 6 OR MORE DRINKS ON ONE OCCASION: NEVER

## 2021-09-28 ASSESSMENT — PATIENT HEALTH QUESTIONNAIRE - PHQ9
SUM OF ALL RESPONSES TO PHQ9 QUESTIONS 1 AND 2: 0
CLINICAL INTERPRETATION OF PHQ9 SCORE: NO FURTHER SCREENING NEEDED

## 2021-09-28 ASSESSMENT — PAIN SCALES - WONG BAKER: WONGBAKER_NUMERICALRESPONSE: 7

## 2021-11-18 ENCOUNTER — HOSPITAL ENCOUNTER (EMERGENCY)
Age: 71
Discharge: HOME | End: 2021-11-18
Attending: EMERGENCY MEDICINE

## 2021-11-18 VITALS
RESPIRATION RATE: 18 BRPM | OXYGEN SATURATION: 96 % | HEIGHT: 63 IN | SYSTOLIC BLOOD PRESSURE: 140 MMHG | WEIGHT: 185 LBS | BODY MASS INDEX: 32.78 KG/M2 | DIASTOLIC BLOOD PRESSURE: 79 MMHG | HEART RATE: 95 BPM | TEMPERATURE: 98.2 F

## 2021-11-18 DIAGNOSIS — U07.1 COVID-19: Primary | ICD-10-CM

## 2021-11-18 PROCEDURE — 99282 EMERGENCY DEPT VISIT SF MDM: CPT

## 2021-12-02 ENCOUNTER — HOSPITAL ENCOUNTER (OUTPATIENT)
Dept: GENERAL RADIOLOGY | Facility: HOSPITAL | Age: 71
Discharge: HOME | End: 2021-12-02
Attending: ORTHOPAEDIC SURGERY
Payer: COMMERCIAL

## 2021-12-02 DIAGNOSIS — M75.42 IMPINGEMENT SYNDROME OF LEFT SHOULDER: ICD-10-CM

## 2021-12-02 PROCEDURE — 73030 X-RAY EXAM OF SHOULDER: CPT | Performed by: ORTHOPAEDIC SURGERY

## 2021-12-13 ENCOUNTER — HOSPITAL ENCOUNTER (OUTPATIENT)
Dept: MRI IMAGING | Age: 71
Discharge: HOME | End: 2021-12-13
Attending: ORTHOPAEDIC SURGERY
Payer: COMMERCIAL

## 2021-12-13 DIAGNOSIS — M75.42 IMPINGEMENT SYNDROME OF LEFT SHOULDER: ICD-10-CM

## 2021-12-13 PROCEDURE — 73221 MRI JOINT UPR EXTREM W/O DYE: CPT | Performed by: ORTHOPAEDIC SURGERY

## 2022-01-04 DIAGNOSIS — M75.112 NONTRAUMATIC INCOMPLETE TEAR OF LEFT ROTATOR CUFF: Primary | ICD-10-CM

## 2022-01-22 ENCOUNTER — APPOINTMENT (OUTPATIENT)
Dept: GENERAL RADIOLOGY | Age: 72
DRG: 394 | End: 2022-01-22
Attending: EMERGENCY MEDICINE

## 2022-01-22 ENCOUNTER — HOSPITAL ENCOUNTER (EMERGENCY)
Age: 72
Discharge: HOME | End: 2022-01-22
Attending: EMERGENCY MEDICINE

## 2022-01-22 ENCOUNTER — HOSPITAL ENCOUNTER (INPATIENT)
Age: 72
LOS: 4 days | Discharge: HOME | DRG: 394 | End: 2022-01-27
Attending: EMERGENCY MEDICINE | Admitting: INTERNAL MEDICINE

## 2022-01-22 VITALS
BODY MASS INDEX: 33.98 KG/M2 | SYSTOLIC BLOOD PRESSURE: 128 MMHG | OXYGEN SATURATION: 97 % | HEIGHT: 63 IN | HEART RATE: 74 BPM | DIASTOLIC BLOOD PRESSURE: 69 MMHG | WEIGHT: 191.8 LBS | TEMPERATURE: 98 F | RESPIRATION RATE: 14 BRPM

## 2022-01-22 DIAGNOSIS — I27.20 PULMONARY HYPERTENSION (CMD): ICD-10-CM

## 2022-01-22 DIAGNOSIS — E78.00 PURE HYPERCHOLESTEROLEMIA: ICD-10-CM

## 2022-01-22 DIAGNOSIS — R11.15 INTRACTABLE CYCLICAL VOMITING SYNDROME: Primary | ICD-10-CM

## 2022-01-22 DIAGNOSIS — R11.15 CYCLIC VOMITING SYNDROME: ICD-10-CM

## 2022-01-22 DIAGNOSIS — I47.10 PAROXYSMAL SUPRAVENTRICULAR TACHYCARDIA: ICD-10-CM

## 2022-01-22 DIAGNOSIS — R11.15 CYCLIC VOMITING SYNDROME: Primary | ICD-10-CM

## 2022-01-22 DIAGNOSIS — R11.2 INTRACTABLE VOMITING WITH NAUSEA: ICD-10-CM

## 2022-01-22 PROBLEM — R00.0 TACHYCARDIA: Status: ACTIVE | Noted: 2022-01-22

## 2022-01-22 PROBLEM — F32.A ANXIETY AND DEPRESSION: Status: ACTIVE | Noted: 2019-07-19

## 2022-01-22 LAB
ALBUMIN SERPL-MCNC: 3.7 G/DL (ref 3.6–5.1)
ALBUMIN/GLOB SERPL: 0.9 {RATIO} (ref 1–2.4)
ALP SERPL-CCNC: 102 UNITS/L (ref 45–117)
ALT SERPL-CCNC: 62 UNITS/L
AMPHETAMINES UR QL SCN>500 NG/ML: NEGATIVE
ANION GAP SERPL CALC-SCNC: 9 MMOL/L (ref 10–20)
APPEARANCE UR: CLEAR
AST SERPL-CCNC: 31 UNITS/L
BACTERIA #/AREA URNS HPF: ABNORMAL /HPF
BARBITURATES UR QL SCN>200 NG/ML: NEGATIVE
BASOPHILS # BLD: 0 K/MCL (ref 0–0.3)
BASOPHILS NFR BLD: 0 %
BENZODIAZ UR QL SCN>200 NG/ML: NEGATIVE
BILIRUB SERPL-MCNC: 0.6 MG/DL (ref 0.2–1)
BILIRUB UR QL STRIP: NEGATIVE
BUN SERPL-MCNC: 14 MG/DL (ref 6–20)
BUN/CREAT SERPL: 19 (ref 7–25)
BZE UR QL SCN>150 NG/ML: NEGATIVE
CALCIUM SERPL-MCNC: 9.7 MG/DL (ref 8.4–10.2)
CANNABINOIDS UR QL SCN>50 NG/ML: NEGATIVE
CHLORIDE SERPL-SCNC: 106 MMOL/L (ref 98–107)
CO2 SERPL-SCNC: 29 MMOL/L (ref 21–32)
COLOR UR: YELLOW
CREAT SERPL-MCNC: 0.73 MG/DL (ref 0.51–0.95)
DEPRECATED RDW RBC: 39.3 FL (ref 39–50)
EOSINOPHIL # BLD: 0 K/MCL (ref 0–0.5)
EOSINOPHIL NFR BLD: 0 %
ERYTHROCYTE [DISTWIDTH] IN BLOOD: 12.1 % (ref 11–15)
FASTING DURATION TIME PATIENT: ABNORMAL H
FLUAV RNA RESP QL NAA+PROBE: NOT DETECTED
FLUBV RNA RESP QL NAA+PROBE: NOT DETECTED
GFR SERPLBLD BASED ON 1.73 SQ M-ARVRAT: 83 ML/MIN
GLOBULIN SER-MCNC: 4.1 G/DL (ref 2–4)
GLUCOSE SERPL-MCNC: 139 MG/DL (ref 70–99)
GLUCOSE UR STRIP-MCNC: NEGATIVE MG/DL
HCT VFR BLD CALC: 46.6 % (ref 36–46.5)
HGB BLD-MCNC: 15.6 G/DL (ref 12–15.5)
HGB UR QL STRIP: NEGATIVE
HYALINE CASTS #/AREA URNS LPF: ABNORMAL /LPF
IMM GRANULOCYTES # BLD AUTO: 0.1 K/MCL (ref 0–0.2)
IMM GRANULOCYTES # BLD: 1 %
KETONES UR STRIP-MCNC: NEGATIVE MG/DL
LEUKOCYTE ESTERASE UR QL STRIP: NEGATIVE
LIPASE SERPL-CCNC: 118 UNITS/L (ref 73–393)
LYMPHOCYTES # BLD: 1.8 K/MCL (ref 1–4)
LYMPHOCYTES NFR BLD: 21 %
MAGNESIUM SERPL-MCNC: 2 MG/DL (ref 1.7–2.4)
MCH RBC QN AUTO: 29.9 PG (ref 26–34)
MCHC RBC AUTO-ENTMCNC: 33.5 G/DL (ref 32–36.5)
MCV RBC AUTO: 89.3 FL (ref 78–100)
MONOCYTES # BLD: 0.4 K/MCL (ref 0.3–0.9)
MONOCYTES NFR BLD: 4 %
MUCOUS THREADS URNS QL MICRO: PRESENT
NEUTROPHILS # BLD: 6.1 K/MCL (ref 1.8–7.7)
NEUTROPHILS NFR BLD: 74 %
NITRITE UR QL STRIP: NEGATIVE
NRBC BLD MANUAL-RTO: 0 /100 WBC
OPIATES UR QL SCN>300 NG/ML: NEGATIVE
PCP UR QL SCN>25 NG/ML: NEGATIVE
PH UR STRIP: 8 [PH] (ref 5–7)
PLATELET # BLD AUTO: 162 K/MCL (ref 140–450)
POTASSIUM SERPL-SCNC: 3.8 MMOL/L (ref 3.4–5.1)
PROT SERPL-MCNC: 7.8 G/DL (ref 6.4–8.2)
PROT UR STRIP-MCNC: NEGATIVE MG/DL
RAINBOW EXTRA TUBES HOLD SPECIMEN: NORMAL
RAINBOW EXTRA TUBES HOLD SPECIMEN: NORMAL
RBC # BLD: 5.22 MIL/MCL (ref 4–5.2)
RBC #/AREA URNS HPF: ABNORMAL /HPF
RSV AG NPH QL IA.RAPID: NOT DETECTED
SARS-COV-2 RNA RESP QL NAA+PROBE: NOT DETECTED
SERVICE CMNT-IMP: NORMAL
SERVICE CMNT-IMP: NORMAL
SODIUM SERPL-SCNC: 140 MMOL/L (ref 135–145)
SP GR UR STRIP: 1.01 (ref 1–1.03)
SQUAMOUS #/AREA URNS HPF: ABNORMAL /HPF
UROBILINOGEN UR STRIP-MCNC: 1 MG/DL
WBC # BLD: 8.4 K/MCL (ref 4.2–11)
WBC #/AREA URNS HPF: ABNORMAL /HPF

## 2022-01-22 PROCEDURE — 10002801 HB RX 250 W/O HCPCS: Performed by: INTERNAL MEDICINE

## 2022-01-22 PROCEDURE — 96375 TX/PRO/DX INJ NEW DRUG ADDON: CPT

## 2022-01-22 PROCEDURE — 96374 THER/PROPH/DIAG INJ IV PUSH: CPT

## 2022-01-22 PROCEDURE — G0378 HOSPITAL OBSERVATION PER HR: HCPCS

## 2022-01-22 PROCEDURE — 96361 HYDRATE IV INFUSION ADD-ON: CPT

## 2022-01-22 PROCEDURE — 10002800 HB RX 250 W HCPCS: Performed by: EMERGENCY MEDICINE

## 2022-01-22 PROCEDURE — 74018 RADEX ABDOMEN 1 VIEW: CPT

## 2022-01-22 PROCEDURE — 93005 ELECTROCARDIOGRAM TRACING: CPT | Performed by: EMERGENCY MEDICINE

## 2022-01-22 PROCEDURE — 85025 COMPLETE CBC W/AUTO DIFF WBC: CPT | Performed by: EMERGENCY MEDICINE

## 2022-01-22 PROCEDURE — 99283 EMERGENCY DEPT VISIT LOW MDM: CPT

## 2022-01-22 PROCEDURE — 99220 INITIAL OBSERVATION CARE,LEVL III: CPT | Performed by: INTERNAL MEDICINE

## 2022-01-22 PROCEDURE — 83690 ASSAY OF LIPASE: CPT | Performed by: EMERGENCY MEDICINE

## 2022-01-22 PROCEDURE — 80053 COMPREHEN METABOLIC PANEL: CPT | Performed by: EMERGENCY MEDICINE

## 2022-01-22 PROCEDURE — 10002800 HB RX 250 W HCPCS: Performed by: INTERNAL MEDICINE

## 2022-01-22 PROCEDURE — 81001 URINALYSIS AUTO W/SCOPE: CPT | Performed by: EMERGENCY MEDICINE

## 2022-01-22 PROCEDURE — 10002807 HB RX 258: Performed by: EMERGENCY MEDICINE

## 2022-01-22 PROCEDURE — 10002803 HB RX 637: Performed by: INTERNAL MEDICINE

## 2022-01-22 PROCEDURE — 99284 EMERGENCY DEPT VISIT MOD MDM: CPT

## 2022-01-22 PROCEDURE — 83735 ASSAY OF MAGNESIUM: CPT | Performed by: EMERGENCY MEDICINE

## 2022-01-22 PROCEDURE — 0241U COVID/FLU/RSV PANEL: CPT | Performed by: EMERGENCY MEDICINE

## 2022-01-22 PROCEDURE — 80307 DRUG TEST PRSMV CHEM ANLYZR: CPT | Performed by: EMERGENCY MEDICINE

## 2022-01-22 PROCEDURE — 10002807 HB RX 258: Performed by: INTERNAL MEDICINE

## 2022-01-22 PROCEDURE — 96376 TX/PRO/DX INJ SAME DRUG ADON: CPT

## 2022-01-22 PROCEDURE — C9803 HOPD COVID-19 SPEC COLLECT: HCPCS

## 2022-01-22 PROCEDURE — 10002801 HB RX 250 W/O HCPCS: Performed by: EMERGENCY MEDICINE

## 2022-01-22 RX ORDER — ENOXAPARIN SODIUM 100 MG/ML
40 INJECTION SUBCUTANEOUS DAILY
Status: DISCONTINUED | OUTPATIENT
Start: 2022-01-23 | End: 2022-01-27 | Stop reason: HOSPADM

## 2022-01-22 RX ORDER — ACETAMINOPHEN 325 MG/1
650 TABLET ORAL EVERY 4 HOURS PRN
Status: DISCONTINUED | OUTPATIENT
Start: 2022-01-22 | End: 2022-01-27 | Stop reason: HOSPADM

## 2022-01-22 RX ORDER — DULOXETIN HYDROCHLORIDE 60 MG/1
60 CAPSULE, DELAYED RELEASE ORAL DAILY
Status: DISCONTINUED | OUTPATIENT
Start: 2022-01-23 | End: 2022-01-27 | Stop reason: HOSPADM

## 2022-01-22 RX ORDER — DIPHENHYDRAMINE HYDROCHLORIDE 50 MG/ML
50 INJECTION INTRAMUSCULAR; INTRAVENOUS ONCE
Status: COMPLETED | OUTPATIENT
Start: 2022-01-22 | End: 2022-01-22

## 2022-01-22 RX ORDER — METOCLOPRAMIDE HYDROCHLORIDE 5 MG/ML
5 INJECTION INTRAMUSCULAR; INTRAVENOUS ONCE
Status: COMPLETED | OUTPATIENT
Start: 2022-01-22 | End: 2022-01-22

## 2022-01-22 RX ORDER — LORAZEPAM 2 MG/ML
2 INJECTION INTRAMUSCULAR ONCE
Status: DISCONTINUED | OUTPATIENT
Start: 2022-01-22 | End: 2022-01-22

## 2022-01-22 RX ORDER — HYDRALAZINE HYDROCHLORIDE 20 MG/ML
10 INJECTION INTRAMUSCULAR; INTRAVENOUS EVERY 6 HOURS PRN
Status: DISCONTINUED | OUTPATIENT
Start: 2022-01-22 | End: 2022-01-25

## 2022-01-22 RX ORDER — TRAMADOL HYDROCHLORIDE 50 MG/1
50 TABLET ORAL EVERY 6 HOURS PRN
Status: DISCONTINUED | OUTPATIENT
Start: 2022-01-22 | End: 2022-01-27 | Stop reason: HOSPADM

## 2022-01-22 RX ORDER — 0.9 % SODIUM CHLORIDE 0.9 %
2 VIAL (ML) INJECTION EVERY 12 HOURS SCHEDULED
Status: DISCONTINUED | OUTPATIENT
Start: 2022-01-22 | End: 2022-01-27 | Stop reason: HOSPADM

## 2022-01-22 RX ORDER — METOPROLOL SUCCINATE 25 MG/1
25 TABLET, EXTENDED RELEASE ORAL 2 TIMES DAILY
Status: DISCONTINUED | OUTPATIENT
Start: 2022-01-22 | End: 2022-01-25

## 2022-01-22 RX ORDER — MAGNESIUM HYDROXIDE/ALUMINUM HYDROXICE/SIMETHICONE 120; 1200; 1200 MG/30ML; MG/30ML; MG/30ML
30 SUSPENSION ORAL EVERY 4 HOURS PRN
Status: DISCONTINUED | OUTPATIENT
Start: 2022-01-22 | End: 2022-01-27 | Stop reason: HOSPADM

## 2022-01-22 RX ORDER — ONDANSETRON 2 MG/ML
4 INJECTION INTRAMUSCULAR; INTRAVENOUS EVERY 6 HOURS PRN
Status: DISCONTINUED | OUTPATIENT
Start: 2022-01-22 | End: 2022-01-27 | Stop reason: HOSPADM

## 2022-01-22 RX ORDER — LORAZEPAM 2 MG/ML
1 INJECTION INTRAMUSCULAR EVERY 6 HOURS PRN
Status: DISCONTINUED | OUTPATIENT
Start: 2022-01-22 | End: 2022-01-26

## 2022-01-22 RX ORDER — DIPHENHYDRAMINE HYDROCHLORIDE 50 MG/ML
50 INJECTION INTRAMUSCULAR; INTRAVENOUS EVERY 4 HOURS PRN
Status: DISCONTINUED | OUTPATIENT
Start: 2022-01-22 | End: 2022-01-27 | Stop reason: HOSPADM

## 2022-01-22 RX ORDER — POLYETHYLENE GLYCOL 3350 17 G/17G
17 POWDER, FOR SOLUTION ORAL DAILY PRN
Status: DISCONTINUED | OUTPATIENT
Start: 2022-01-22 | End: 2022-01-27 | Stop reason: HOSPADM

## 2022-01-22 RX ORDER — LORAZEPAM 2 MG/ML
2 INJECTION INTRAMUSCULAR ONCE
Status: COMPLETED | OUTPATIENT
Start: 2022-01-22 | End: 2022-01-22

## 2022-01-22 RX ORDER — PANTOPRAZOLE SODIUM 40 MG/1
40 TABLET, DELAYED RELEASE ORAL 2 TIMES DAILY
Status: DISCONTINUED | OUTPATIENT
Start: 2022-01-22 | End: 2022-01-27 | Stop reason: HOSPADM

## 2022-01-22 RX ORDER — AMOXICILLIN 250 MG
2 CAPSULE ORAL DAILY PRN
Status: DISCONTINUED | OUTPATIENT
Start: 2022-01-22 | End: 2022-01-27 | Stop reason: HOSPADM

## 2022-01-22 RX ORDER — ERGOTAMINE TARTRATE AND CAFFEINE 1; 100 MG/1; MG/1
TABLET, FILM COATED ORAL
Qty: 15 TABLET | Refills: 0 | Status: SHIPPED | OUTPATIENT
Start: 2022-01-22

## 2022-01-22 RX ORDER — ATORVASTATIN CALCIUM 40 MG/1
40 TABLET, FILM COATED ORAL DAILY
Status: DISCONTINUED | OUTPATIENT
Start: 2022-01-23 | End: 2022-01-27 | Stop reason: HOSPADM

## 2022-01-22 RX ORDER — SODIUM CHLORIDE 9 MG/ML
INJECTION, SOLUTION INTRAVENOUS CONTINUOUS
Status: DISCONTINUED | OUTPATIENT
Start: 2022-01-22 | End: 2022-01-26

## 2022-01-22 RX ORDER — HYDROCODONE BITARTRATE AND ACETAMINOPHEN 10; 325 MG/1; MG/1
1 TABLET ORAL EVERY 6 HOURS PRN
Status: DISCONTINUED | OUTPATIENT
Start: 2022-01-22 | End: 2022-01-27 | Stop reason: HOSPADM

## 2022-01-22 RX ORDER — HALOPERIDOL 5 MG/ML
5 INJECTION INTRAMUSCULAR ONCE
Status: COMPLETED | OUTPATIENT
Start: 2022-01-22 | End: 2022-01-22

## 2022-01-22 RX ORDER — POTASSIUM CHLORIDE 20 MEQ/1
40 TABLET, EXTENDED RELEASE ORAL ONCE
Status: DISCONTINUED | OUTPATIENT
Start: 2022-01-22 | End: 2022-01-23

## 2022-01-22 RX ORDER — PROPAFENONE HYDROCHLORIDE 225 MG/1
225 TABLET, FILM COATED ORAL
Status: ON HOLD | COMMUNITY
Start: 2021-12-20 | End: 2022-01-22

## 2022-01-22 RX ADMIN — DIPHENHYDRAMINE HYDROCHLORIDE 50 MG: 50 INJECTION INTRAMUSCULAR; INTRAVENOUS at 10:06

## 2022-01-22 RX ADMIN — METOCLOPRAMIDE HYDROCHLORIDE 5 MG: 5 INJECTION INTRAMUSCULAR; INTRAVENOUS at 11:32

## 2022-01-22 RX ADMIN — SODIUM CHLORIDE 1000 ML: 9 INJECTION, SOLUTION INTRAVENOUS at 10:10

## 2022-01-22 RX ADMIN — FENTANYL CITRATE 50 MCG: 50 INJECTION INTRAMUSCULAR; INTRAVENOUS at 11:34

## 2022-01-22 RX ADMIN — DIPHENHYDRAMINE HYDROCHLORIDE 50 MG: 50 INJECTION, SOLUTION INTRAMUSCULAR; INTRAVENOUS at 15:20

## 2022-01-22 RX ADMIN — TRAZODONE HYDROCHLORIDE 150 MG: 50 TABLET ORAL at 22:28

## 2022-01-22 RX ADMIN — LORAZEPAM 2 MG: 2 INJECTION INTRAMUSCULAR; INTRAVENOUS at 16:17

## 2022-01-22 RX ADMIN — FENTANYL CITRATE 50 MCG: 50 INJECTION INTRAMUSCULAR; INTRAVENOUS at 10:16

## 2022-01-22 RX ADMIN — HALOPERIDOL LACTATE 5 MG: 5 INJECTION INTRAMUSCULAR at 15:29

## 2022-01-22 RX ADMIN — SODIUM CHLORIDE: 9 INJECTION, SOLUTION INTRAVENOUS at 18:55

## 2022-01-22 RX ADMIN — PANTOPRAZOLE SODIUM 40 MG: 40 TABLET, DELAYED RELEASE ORAL at 22:28

## 2022-01-22 RX ADMIN — FENTANYL CITRATE 50 MCG: 50 INJECTION INTRAMUSCULAR; INTRAVENOUS at 22:31

## 2022-01-22 RX ADMIN — DIPHENHYDRAMINE HYDROCHLORIDE 50 MG: 50 INJECTION, SOLUTION INTRAMUSCULAR; INTRAVENOUS at 22:29

## 2022-01-22 RX ADMIN — TRAMADOL HYDROCHLORIDE 50 MG: 50 TABLET ORAL at 18:21

## 2022-01-22 RX ADMIN — METOPROLOL SUCCINATE 25 MG: 25 TABLET, EXTENDED RELEASE ORAL at 22:28

## 2022-01-22 RX ADMIN — ONDANSETRON 4 MG: 2 INJECTION INTRAMUSCULAR; INTRAVENOUS at 18:20

## 2022-01-22 RX ADMIN — FENTANYL CITRATE 25 MCG: 50 INJECTION INTRAMUSCULAR; INTRAVENOUS at 20:01

## 2022-01-22 RX ADMIN — METOCLOPRAMIDE HYDROCHLORIDE 5 MG: 5 INJECTION INTRAMUSCULAR; INTRAVENOUS at 10:17

## 2022-01-22 ASSESSMENT — ENCOUNTER SYMPTOMS
APPETITE CHANGE: 1
FEVER: 0
VOMITING: 1
NERVOUS/ANXIOUS: 1
NAUSEA: 1

## 2022-01-22 ASSESSMENT — PAIN SCALES - GENERAL
PAINLEVEL_OUTOF10: 8
PAINLEVEL_OUTOF10: 0
PAINLEVEL_OUTOF10: 7
PAINLEVEL_OUTOF10: 8
PAINLEVEL_OUTOF10: 7

## 2022-01-22 ASSESSMENT — LIFESTYLE VARIABLES
HOW MANY STANDARD DRINKS CONTAINING ALCOHOL DO YOU HAVE ON A TYPICAL DAY: 0,1 OR 2
HOW OFTEN DO YOU HAVE 6 OR MORE DRINKS ON ONE OCCASION: NEVER
ALCOHOL_USE_STATUS: NO OR LOW RISK WITH VALIDATED TOOL
HOW OFTEN DO YOU HAVE A DRINK CONTAINING ALCOHOL: 2 TO 4 TIMES PER MONTH
AUDIT-C TOTAL SCORE: 2

## 2022-01-22 ASSESSMENT — ACTIVITIES OF DAILY LIVING (ADL)
ADL_SHORT_OF_BREATH: NO
ADL_SCORE: 12
ADL_BEFORE_ADMISSION: INDEPENDENT
CHRONIC_PAIN_PRESENT: NO
RECENT_DECLINE_ADL: NO

## 2022-01-22 ASSESSMENT — COGNITIVE AND FUNCTIONAL STATUS - GENERAL
ARE YOU DEAF OR DO YOU HAVE SERIOUS DIFFICULTY  HEARING: NO
BECAUSE OF A PHYSICAL, MENTAL, OR EMOTIONAL CONDITION, DO YOU HAVE DIFFICULTY DOING ERRANDS ALONE: NO
ARE YOU BLIND OR DO YOU HAVE SERIOUS DIFFICULTY SEEING, EVEN WHEN WEARING GLASSES: NO
DO YOU HAVE DIFFICULTY DRESSING OR BATHING: NO
DO YOU HAVE SERIOUS DIFFICULTY WALKING OR CLIMBING STAIRS: NO
BECAUSE OF A PHYSICAL, MENTAL, OR EMOTIONAL CONDITION, DO YOU HAVE SERIOUS DIFFICULTY CONCENTRATING, REMEMBERING OR MAKING DECISIONS: NO

## 2022-01-22 ASSESSMENT — PATIENT HEALTH QUESTIONNAIRE - PHQ9
1. LITTLE INTEREST OR PLEASURE IN DOING THINGS: NOT AT ALL
CLINICAL INTERPRETATION OF PHQ2 SCORE: NO FURTHER SCREENING NEEDED
IS PATIENT ABLE TO COMPLETE PHQ2 OR PHQ9: YES
SUM OF ALL RESPONSES TO PHQ9 QUESTIONS 1 AND 2: 1
SUM OF ALL RESPONSES TO PHQ9 QUESTIONS 1 AND 2: 1
2. FEELING DOWN, DEPRESSED OR HOPELESS: SEVERAL DAYS

## 2022-01-22 ASSESSMENT — COLUMBIA-SUICIDE SEVERITY RATING SCALE - C-SSRS
1. WITHIN THE PAST MONTH, HAVE YOU WISHED YOU WERE DEAD OR WISHED YOU COULD GO TO SLEEP AND NOT WAKE UP?: NO
IS THE PATIENT ABLE TO COMPLETE C-SSRS: YES
6. HAVE YOU EVER DONE ANYTHING, STARTED TO DO ANYTHING, OR PREPARED TO DO ANYTHING TO END YOUR LIFE?: NO
2. HAVE YOU ACTUALLY HAD ANY THOUGHTS OF KILLING YOURSELF?: NO

## 2022-01-23 LAB
ALBUMIN SERPL-MCNC: 3.1 G/DL (ref 3.6–5.1)
ALBUMIN/GLOB SERPL: 0.9 {RATIO} (ref 1–2.4)
ALP SERPL-CCNC: 78 UNITS/L (ref 45–117)
ALT SERPL-CCNC: 43 UNITS/L
ANION GAP SERPL CALC-SCNC: 9 MMOL/L (ref 10–20)
AST SERPL-CCNC: 21 UNITS/L
ATRIAL RATE (BPM): 74
BASOPHILS # BLD: 0 K/MCL (ref 0–0.3)
BASOPHILS NFR BLD: 0 %
BILIRUB SERPL-MCNC: 0.7 MG/DL (ref 0.2–1)
BUN SERPL-MCNC: 12 MG/DL (ref 6–20)
BUN/CREAT SERPL: 20 (ref 7–25)
CALCIUM SERPL-MCNC: 8.4 MG/DL (ref 8.4–10.2)
CHLORIDE SERPL-SCNC: 109 MMOL/L (ref 98–107)
CO2 SERPL-SCNC: 26 MMOL/L (ref 21–32)
CREAT SERPL-MCNC: 0.6 MG/DL (ref 0.51–0.95)
DEPRECATED RDW RBC: 40.4 FL (ref 39–50)
EOSINOPHIL # BLD: 0 K/MCL (ref 0–0.5)
EOSINOPHIL NFR BLD: 0 %
ERYTHROCYTE [DISTWIDTH] IN BLOOD: 12.3 % (ref 11–15)
FASTING DURATION TIME PATIENT: ABNORMAL H
GFR SERPLBLD BASED ON 1.73 SQ M-ARVRAT: >90 ML/MIN
GLOBULIN SER-MCNC: 3.3 G/DL (ref 2–4)
GLUCOSE SERPL-MCNC: 138 MG/DL (ref 70–99)
HCT VFR BLD CALC: 40.4 % (ref 36–46.5)
HGB BLD-MCNC: 13.5 G/DL (ref 12–15.5)
IMM GRANULOCYTES # BLD AUTO: 0.1 K/MCL (ref 0–0.2)
IMM GRANULOCYTES # BLD: 1 %
LYMPHOCYTES # BLD: 1.5 K/MCL (ref 1–4)
LYMPHOCYTES NFR BLD: 14 %
MAGNESIUM SERPL-MCNC: 1.8 MG/DL (ref 1.7–2.4)
MCH RBC QN AUTO: 30.2 PG (ref 26–34)
MCHC RBC AUTO-ENTMCNC: 33.4 G/DL (ref 32–36.5)
MCV RBC AUTO: 90.4 FL (ref 78–100)
MONOCYTES # BLD: 0.4 K/MCL (ref 0.3–0.9)
MONOCYTES NFR BLD: 4 %
NEUTROPHILS # BLD: 8.7 K/MCL (ref 1.8–7.7)
NEUTROPHILS NFR BLD: 81 %
NRBC BLD MANUAL-RTO: 0 /100 WBC
P AXIS (DEGREES): 41
PLATELET # BLD AUTO: 139 K/MCL (ref 140–450)
POTASSIUM SERPL-SCNC: 2.9 MMOL/L (ref 3.4–5.1)
POTASSIUM SERPL-SCNC: 3.6 MMOL/L (ref 3.4–5.1)
PR-INTERVAL (MSEC): 144
PROT SERPL-MCNC: 6.4 G/DL (ref 6.4–8.2)
QRS-INTERVAL (MSEC): 84
QT-INTERVAL (MSEC): 398
QTC: 442
R AXIS (DEGREES): 67
RBC # BLD: 4.47 MIL/MCL (ref 4–5.2)
REPORT TEXT: NORMAL
SODIUM SERPL-SCNC: 141 MMOL/L (ref 135–145)
T AXIS (DEGREES): 57
VENTRICULAR RATE EKG/MIN (BPM): 74
WBC # BLD: 10.8 K/MCL (ref 4.2–11)

## 2022-01-23 PROCEDURE — 96376 TX/PRO/DX INJ SAME DRUG ADON: CPT

## 2022-01-23 PROCEDURE — 83735 ASSAY OF MAGNESIUM: CPT | Performed by: INTERNAL MEDICINE

## 2022-01-23 PROCEDURE — 96375 TX/PRO/DX INJ NEW DRUG ADDON: CPT

## 2022-01-23 PROCEDURE — 10002800 HB RX 250 W HCPCS: Performed by: INTERNAL MEDICINE

## 2022-01-23 PROCEDURE — G0378 HOSPITAL OBSERVATION PER HR: HCPCS

## 2022-01-23 PROCEDURE — 99233 SBSQ HOSP IP/OBS HIGH 50: CPT | Performed by: INTERNAL MEDICINE

## 2022-01-23 PROCEDURE — 10002803 HB RX 637: Performed by: INTERNAL MEDICINE

## 2022-01-23 PROCEDURE — 36415 COLL VENOUS BLD VENIPUNCTURE: CPT | Performed by: INTERNAL MEDICINE

## 2022-01-23 PROCEDURE — 84132 ASSAY OF SERUM POTASSIUM: CPT | Performed by: INTERNAL MEDICINE

## 2022-01-23 PROCEDURE — 80053 COMPREHEN METABOLIC PANEL: CPT | Performed by: INTERNAL MEDICINE

## 2022-01-23 PROCEDURE — 10000002 HB ROOM CHARGE MED SURG

## 2022-01-23 PROCEDURE — 85025 COMPLETE CBC W/AUTO DIFF WBC: CPT | Performed by: INTERNAL MEDICINE

## 2022-01-23 RX ORDER — POTASSIUM CHLORIDE 1.5 G/1.58G
40 POWDER, FOR SOLUTION ORAL ONCE
Status: COMPLETED | OUTPATIENT
Start: 2022-01-23 | End: 2022-01-23

## 2022-01-23 RX ORDER — METOCLOPRAMIDE HYDROCHLORIDE 5 MG/ML
5 INJECTION INTRAMUSCULAR; INTRAVENOUS
Status: DISCONTINUED | OUTPATIENT
Start: 2022-01-23 | End: 2022-01-24

## 2022-01-23 RX ADMIN — PANTOPRAZOLE SODIUM 40 MG: 40 TABLET, DELAYED RELEASE ORAL at 09:01

## 2022-01-23 RX ADMIN — HYDROCODONE BITARTRATE AND ACETAMINOPHEN 1 TABLET: 10; 325 TABLET ORAL at 12:57

## 2022-01-23 RX ADMIN — ATORVASTATIN CALCIUM 40 MG: 40 TABLET ORAL at 09:01

## 2022-01-23 RX ADMIN — HYDROCODONE BITARTRATE AND ACETAMINOPHEN 1 TABLET: 10; 325 TABLET ORAL at 06:14

## 2022-01-23 RX ADMIN — METOPROLOL SUCCINATE 25 MG: 25 TABLET, EXTENDED RELEASE ORAL at 09:01

## 2022-01-23 RX ADMIN — HYDROCODONE BITARTRATE AND ACETAMINOPHEN 1 TABLET: 10; 325 TABLET ORAL at 18:12

## 2022-01-23 RX ADMIN — PANTOPRAZOLE SODIUM 40 MG: 40 TABLET, DELAYED RELEASE ORAL at 21:25

## 2022-01-23 RX ADMIN — TRAMADOL HYDROCHLORIDE 50 MG: 50 TABLET ORAL at 15:51

## 2022-01-23 RX ADMIN — HYDRALAZINE HYDROCHLORIDE 10 MG: 20 INJECTION INTRAMUSCULAR; INTRAVENOUS at 00:06

## 2022-01-23 RX ADMIN — METOCLOPRAMIDE HYDROCHLORIDE 5 MG: 5 INJECTION INTRAMUSCULAR; INTRAVENOUS at 15:51

## 2022-01-23 RX ADMIN — HYDRALAZINE HYDROCHLORIDE 10 MG: 20 INJECTION INTRAMUSCULAR; INTRAVENOUS at 15:52

## 2022-01-23 RX ADMIN — LORAZEPAM 1 MG: 2 INJECTION INTRAMUSCULAR; INTRAVENOUS at 18:11

## 2022-01-23 RX ADMIN — POTASSIUM CHLORIDE 40 MEQ: 1.5 FOR SOLUTION ORAL at 12:57

## 2022-01-23 RX ADMIN — LORAZEPAM 1 MG: 2 INJECTION INTRAMUSCULAR; INTRAVENOUS at 00:08

## 2022-01-23 RX ADMIN — METOCLOPRAMIDE HYDROCHLORIDE 5 MG: 5 INJECTION INTRAMUSCULAR; INTRAVENOUS at 12:57

## 2022-01-23 RX ADMIN — METOPROLOL SUCCINATE 25 MG: 25 TABLET, EXTENDED RELEASE ORAL at 21:25

## 2022-01-23 RX ADMIN — TRAZODONE HYDROCHLORIDE 150 MG: 50 TABLET ORAL at 21:25

## 2022-01-23 RX ADMIN — DULOXETINE HYDROCHLORIDE 60 MG: 60 CAPSULE, DELAYED RELEASE ORAL at 09:01

## 2022-01-23 RX ADMIN — POTASSIUM CHLORIDE 40 MEQ: 1.5 FOR SOLUTION ORAL at 21:22

## 2022-01-23 ASSESSMENT — PAIN SCALES - GENERAL
PAINLEVEL_OUTOF10: 0
PAINLEVEL_OUTOF10: 3
PAINLEVEL_OUTOF10: 2
PAINLEVEL_OUTOF10: 8
PAINLEVEL_OUTOF10: 0
PAINLEVEL_OUTOF10: 1
PAINLEVEL_OUTOF10: 3
PAINLEVEL_OUTOF10: 7

## 2022-01-24 ENCOUNTER — APPOINTMENT (OUTPATIENT)
Dept: GENERAL RADIOLOGY | Age: 72
DRG: 394 | End: 2022-01-24
Attending: INTERNAL MEDICINE

## 2022-01-24 ENCOUNTER — APPOINTMENT (OUTPATIENT)
Dept: CT IMAGING | Age: 72
DRG: 394 | End: 2022-01-24
Attending: INTERNAL MEDICINE

## 2022-01-24 LAB — POTASSIUM SERPL-SCNC: 3.9 MMOL/L (ref 3.4–5.1)

## 2022-01-24 PROCEDURE — 10000002 HB ROOM CHARGE MED SURG

## 2022-01-24 PROCEDURE — 10002803 HB RX 637: Performed by: INTERNAL MEDICINE

## 2022-01-24 PROCEDURE — 10004651 HB RX, NO CHARGE ITEM: Performed by: INTERNAL MEDICINE

## 2022-01-24 PROCEDURE — 74176 CT ABD & PELVIS W/O CONTRAST: CPT

## 2022-01-24 PROCEDURE — 10002800 HB RX 250 W HCPCS: Performed by: INTERNAL MEDICINE

## 2022-01-24 PROCEDURE — 36415 COLL VENOUS BLD VENIPUNCTURE: CPT | Performed by: INTERNAL MEDICINE

## 2022-01-24 PROCEDURE — 74018 RADEX ABDOMEN 1 VIEW: CPT

## 2022-01-24 PROCEDURE — 84132 ASSAY OF SERUM POTASSIUM: CPT | Performed by: INTERNAL MEDICINE

## 2022-01-24 PROCEDURE — G1004 CDSM NDSC: HCPCS

## 2022-01-24 PROCEDURE — 99233 SBSQ HOSP IP/OBS HIGH 50: CPT | Performed by: INTERNAL MEDICINE

## 2022-01-24 PROCEDURE — 10002807 HB RX 258: Performed by: INTERNAL MEDICINE

## 2022-01-24 PROCEDURE — 10002801 HB RX 250 W/O HCPCS: Performed by: INTERNAL MEDICINE

## 2022-01-24 RX ORDER — POTASSIUM CHLORIDE 1.5 G/1.58G
40 POWDER, FOR SOLUTION ORAL ONCE
Status: COMPLETED | OUTPATIENT
Start: 2022-01-24 | End: 2022-01-24

## 2022-01-24 RX ORDER — METOCLOPRAMIDE HYDROCHLORIDE 5 MG/ML
5 INJECTION INTRAMUSCULAR; INTRAVENOUS
Status: DISCONTINUED | OUTPATIENT
Start: 2022-01-25 | End: 2022-01-27 | Stop reason: HOSPADM

## 2022-01-24 RX ORDER — BUTALBITAL, ACETAMINOPHEN AND CAFFEINE 50; 325; 40 MG/1; MG/1; MG/1
1 TABLET ORAL EVERY 4 HOURS PRN
Status: DISCONTINUED | OUTPATIENT
Start: 2022-01-24 | End: 2022-01-27 | Stop reason: HOSPADM

## 2022-01-24 RX ADMIN — LORAZEPAM 1 MG: 2 INJECTION INTRAMUSCULAR; INTRAVENOUS at 13:39

## 2022-01-24 RX ADMIN — LORAZEPAM 1 MG: 2 INJECTION INTRAMUSCULAR; INTRAVENOUS at 20:28

## 2022-01-24 RX ADMIN — HYDROCODONE BITARTRATE AND ACETAMINOPHEN 1 TABLET: 10; 325 TABLET ORAL at 10:25

## 2022-01-24 RX ADMIN — BUTALBITAL, ACETAMINOPHEN AND CAFFEINE 1 TABLET: 50; 325; 40 TABLET ORAL at 11:29

## 2022-01-24 RX ADMIN — ALUMINUM HYDROXIDE, MAGNESIUM HYDROXIDE, SIMETHICONE 30 ML: 400; 400; 40 SUSPENSION ORAL at 17:40

## 2022-01-24 RX ADMIN — PANTOPRAZOLE SODIUM 40 MG: 40 TABLET, DELAYED RELEASE ORAL at 20:28

## 2022-01-24 RX ADMIN — DIPHENHYDRAMINE HYDROCHLORIDE 50 MG: 50 INJECTION, SOLUTION INTRAMUSCULAR; INTRAVENOUS at 14:30

## 2022-01-24 RX ADMIN — SODIUM CHLORIDE: 9 INJECTION, SOLUTION INTRAVENOUS at 14:54

## 2022-01-24 RX ADMIN — ATORVASTATIN CALCIUM 40 MG: 40 TABLET ORAL at 08:30

## 2022-01-24 RX ADMIN — DULOXETINE HYDROCHLORIDE 60 MG: 60 CAPSULE, DELAYED RELEASE ORAL at 08:30

## 2022-01-24 RX ADMIN — PANTOPRAZOLE SODIUM 40 MG: 40 TABLET, DELAYED RELEASE ORAL at 08:30

## 2022-01-24 RX ADMIN — BUTALBITAL, ACETAMINOPHEN AND CAFFEINE 1 TABLET: 50; 325; 40 TABLET ORAL at 21:34

## 2022-01-24 RX ADMIN — METOCLOPRAMIDE HYDROCHLORIDE 5 MG: 5 INJECTION INTRAMUSCULAR; INTRAVENOUS at 16:45

## 2022-01-24 RX ADMIN — METOPROLOL SUCCINATE 25 MG: 25 TABLET, EXTENDED RELEASE ORAL at 20:28

## 2022-01-24 RX ADMIN — FENTANYL CITRATE 50 MCG: 50 INJECTION INTRAMUSCULAR; INTRAVENOUS at 18:10

## 2022-01-24 RX ADMIN — METOPROLOL SUCCINATE 25 MG: 25 TABLET, EXTENDED RELEASE ORAL at 08:30

## 2022-01-24 RX ADMIN — POTASSIUM CHLORIDE 40 MEQ: 1.5 FOR SOLUTION ORAL at 10:26

## 2022-01-24 RX ADMIN — TRAMADOL HYDROCHLORIDE 50 MG: 50 TABLET ORAL at 08:30

## 2022-01-24 RX ADMIN — TRAZODONE HYDROCHLORIDE 150 MG: 50 TABLET ORAL at 20:28

## 2022-01-24 RX ADMIN — METOCLOPRAMIDE HYDROCHLORIDE 5 MG: 5 INJECTION INTRAMUSCULAR; INTRAVENOUS at 06:46

## 2022-01-24 RX ADMIN — HYDROCODONE BITARTRATE AND ACETAMINOPHEN 1 TABLET: 10; 325 TABLET ORAL at 23:27

## 2022-01-24 RX ADMIN — SODIUM CHLORIDE, PRESERVATIVE FREE 2 ML: 5 INJECTION INTRAVENOUS at 20:45

## 2022-01-24 RX ADMIN — HYDRALAZINE HYDROCHLORIDE 10 MG: 20 INJECTION INTRAMUSCULAR; INTRAVENOUS at 17:36

## 2022-01-24 RX ADMIN — SODIUM CHLORIDE: 9 INJECTION, SOLUTION INTRAVENOUS at 03:52

## 2022-01-24 RX ADMIN — METOCLOPRAMIDE HYDROCHLORIDE 5 MG: 5 INJECTION INTRAMUSCULAR; INTRAVENOUS at 11:43

## 2022-01-24 ASSESSMENT — PAIN SCALES - GENERAL
PAINLEVEL_OUTOF10: 10
PAINLEVEL_OUTOF10: 2
PAINLEVEL_OUTOF10: 10
PAINLEVEL_OUTOF10: 9
PAINLEVEL_OUTOF10: 3
PAINLEVEL_OUTOF10: 7
PAINLEVEL_OUTOF10: 10

## 2022-01-25 LAB
ANION GAP SERPL CALC-SCNC: 10 MMOL/L (ref 10–20)
BUN SERPL-MCNC: 9 MG/DL (ref 6–20)
BUN/CREAT SERPL: 15 (ref 7–25)
CALCIUM SERPL-MCNC: 9.1 MG/DL (ref 8.4–10.2)
CHLORIDE SERPL-SCNC: 102 MMOL/L (ref 98–107)
CO2 SERPL-SCNC: 26 MMOL/L (ref 21–32)
CREAT SERPL-MCNC: 0.59 MG/DL (ref 0.51–0.95)
DEPRECATED RDW RBC: 39.4 FL (ref 39–50)
ERYTHROCYTE [DISTWIDTH] IN BLOOD: 12.1 % (ref 11–15)
FASTING DURATION TIME PATIENT: ABNORMAL H
GFR SERPLBLD BASED ON 1.73 SQ M-ARVRAT: >90 ML/MIN
GLUCOSE SERPL-MCNC: 123 MG/DL (ref 70–99)
HCT VFR BLD CALC: 43.4 % (ref 36–46.5)
HGB BLD-MCNC: 14.6 G/DL (ref 12–15.5)
MCH RBC QN AUTO: 29.8 PG (ref 26–34)
MCHC RBC AUTO-ENTMCNC: 33.6 G/DL (ref 32–36.5)
MCV RBC AUTO: 88.6 FL (ref 78–100)
NRBC BLD MANUAL-RTO: 0 /100 WBC
PLATELET # BLD AUTO: 139 K/MCL (ref 140–450)
POTASSIUM SERPL-SCNC: 3.4 MMOL/L (ref 3.4–5.1)
POTASSIUM SERPL-SCNC: 3.4 MMOL/L (ref 3.4–5.1)
RBC # BLD: 4.9 MIL/MCL (ref 4–5.2)
SODIUM SERPL-SCNC: 135 MMOL/L (ref 135–145)
WBC # BLD: 10.6 K/MCL (ref 4.2–11)

## 2022-01-25 PROCEDURE — 85027 COMPLETE CBC AUTOMATED: CPT | Performed by: INTERNAL MEDICINE

## 2022-01-25 PROCEDURE — 10002807 HB RX 258: Performed by: INTERNAL MEDICINE

## 2022-01-25 PROCEDURE — 10002800 HB RX 250 W HCPCS: Performed by: INTERNAL MEDICINE

## 2022-01-25 PROCEDURE — 84132 ASSAY OF SERUM POTASSIUM: CPT | Performed by: INTERNAL MEDICINE

## 2022-01-25 PROCEDURE — 80048 BASIC METABOLIC PNL TOTAL CA: CPT | Performed by: INTERNAL MEDICINE

## 2022-01-25 PROCEDURE — 10002803 HB RX 637: Performed by: INTERNAL MEDICINE

## 2022-01-25 PROCEDURE — 10000002 HB ROOM CHARGE MED SURG

## 2022-01-25 PROCEDURE — 99233 SBSQ HOSP IP/OBS HIGH 50: CPT | Performed by: INTERNAL MEDICINE

## 2022-01-25 PROCEDURE — 10004651 HB RX, NO CHARGE ITEM: Performed by: INTERNAL MEDICINE

## 2022-01-25 PROCEDURE — 36415 COLL VENOUS BLD VENIPUNCTURE: CPT | Performed by: INTERNAL MEDICINE

## 2022-01-25 RX ORDER — POTASSIUM CHLORIDE 20 MEQ/1
40 TABLET, EXTENDED RELEASE ORAL ONCE
Status: COMPLETED | OUTPATIENT
Start: 2022-01-25 | End: 2022-01-25

## 2022-01-25 RX ORDER — POTASSIUM CHLORIDE 14.9 MG/ML
20 INJECTION INTRAVENOUS ONCE
Status: DISCONTINUED | OUTPATIENT
Start: 2022-01-25 | End: 2022-01-25 | Stop reason: RX

## 2022-01-25 RX ORDER — CARVEDILOL 6.25 MG/1
6.25 TABLET ORAL EVERY 12 HOURS SCHEDULED
Status: DISCONTINUED | OUTPATIENT
Start: 2022-01-25 | End: 2022-01-26

## 2022-01-25 RX ORDER — HYDRALAZINE HYDROCHLORIDE 20 MG/ML
10 INJECTION INTRAMUSCULAR; INTRAVENOUS EVERY 6 HOURS PRN
Status: DISCONTINUED | OUTPATIENT
Start: 2022-01-25 | End: 2022-01-27 | Stop reason: HOSPADM

## 2022-01-25 RX ORDER — SUMATRIPTAN 50 MG/1
50 TABLET, FILM COATED ORAL ONCE
Status: COMPLETED | OUTPATIENT
Start: 2022-01-25 | End: 2022-01-25

## 2022-01-25 RX ADMIN — BUTALBITAL, ACETAMINOPHEN AND CAFFEINE 1 TABLET: 50; 325; 40 TABLET ORAL at 09:58

## 2022-01-25 RX ADMIN — METOCLOPRAMIDE HYDROCHLORIDE 5 MG: 5 INJECTION INTRAMUSCULAR; INTRAVENOUS at 09:59

## 2022-01-25 RX ADMIN — DULOXETINE HYDROCHLORIDE 60 MG: 60 CAPSULE, DELAYED RELEASE ORAL at 09:58

## 2022-01-25 RX ADMIN — ONDANSETRON 4 MG: 2 INJECTION INTRAMUSCULAR; INTRAVENOUS at 02:30

## 2022-01-25 RX ADMIN — POTASSIUM CHLORIDE 40 MEQ: 1500 TABLET, EXTENDED RELEASE ORAL at 21:12

## 2022-01-25 RX ADMIN — SUMATRIPTAN SUCCINATE 50 MG: 50 TABLET ORAL at 15:30

## 2022-01-25 RX ADMIN — ONDANSETRON 4 MG: 2 INJECTION INTRAMUSCULAR; INTRAVENOUS at 23:14

## 2022-01-25 RX ADMIN — HYDROCODONE BITARTRATE AND ACETAMINOPHEN 1 TABLET: 10; 325 TABLET ORAL at 09:58

## 2022-01-25 RX ADMIN — PANTOPRAZOLE SODIUM 40 MG: 40 TABLET, DELAYED RELEASE ORAL at 09:58

## 2022-01-25 RX ADMIN — TRAMADOL HYDROCHLORIDE 50 MG: 50 TABLET ORAL at 15:30

## 2022-01-25 RX ADMIN — CARVEDILOL 6.25 MG: 6.25 TABLET, FILM COATED ORAL at 21:13

## 2022-01-25 RX ADMIN — METOPROLOL SUCCINATE 25 MG: 25 TABLET, EXTENDED RELEASE ORAL at 09:58

## 2022-01-25 RX ADMIN — POTASSIUM CHLORIDE 40 MEQ: 1500 TABLET, EXTENDED RELEASE ORAL at 09:58

## 2022-01-25 RX ADMIN — ACETAMINOPHEN 650 MG: 325 TABLET ORAL at 21:12

## 2022-01-25 RX ADMIN — METOCLOPRAMIDE HYDROCHLORIDE 5 MG: 5 INJECTION INTRAMUSCULAR; INTRAVENOUS at 06:27

## 2022-01-25 RX ADMIN — BUTALBITAL, ACETAMINOPHEN AND CAFFEINE 1 TABLET: 50; 325; 40 TABLET ORAL at 03:40

## 2022-01-25 RX ADMIN — ATORVASTATIN CALCIUM 40 MG: 40 TABLET ORAL at 09:58

## 2022-01-25 RX ADMIN — SODIUM CHLORIDE: 9 INJECTION, SOLUTION INTRAVENOUS at 02:29

## 2022-01-25 RX ADMIN — PANTOPRAZOLE SODIUM 40 MG: 40 TABLET, DELAYED RELEASE ORAL at 21:13

## 2022-01-25 RX ADMIN — METOCLOPRAMIDE HYDROCHLORIDE 5 MG: 5 INJECTION INTRAMUSCULAR; INTRAVENOUS at 15:30

## 2022-01-25 RX ADMIN — TRAZODONE HYDROCHLORIDE 150 MG: 50 TABLET ORAL at 21:12

## 2022-01-25 ASSESSMENT — PAIN SCALES - GENERAL
PAINLEVEL_OUTOF10: 6
PAINLEVEL_OUTOF10: 7
PAINLEVEL_OUTOF10: 6
PAINLEVEL_OUTOF10: 3

## 2022-01-25 ASSESSMENT — ENCOUNTER SYMPTOMS
CONSTITUTIONAL NEGATIVE: 1
PSYCHIATRIC NEGATIVE: 1
EYES NEGATIVE: 1
NEUROLOGICAL NEGATIVE: 1
RESPIRATORY NEGATIVE: 1
GASTROINTESTINAL NEGATIVE: 1

## 2022-01-26 ENCOUNTER — APPOINTMENT (OUTPATIENT)
Dept: GASTROENTEROLOGY | Age: 72
DRG: 394 | End: 2022-01-26
Attending: INTERNAL MEDICINE

## 2022-01-26 ENCOUNTER — ANESTHESIA (OUTPATIENT)
Dept: GASTROENTEROLOGY | Age: 72
DRG: 394 | End: 2022-01-26

## 2022-01-26 ENCOUNTER — ANESTHESIA EVENT (OUTPATIENT)
Dept: GASTROENTEROLOGY | Age: 72
DRG: 394 | End: 2022-01-26

## 2022-01-26 LAB
ANION GAP SERPL CALC-SCNC: 10 MMOL/L (ref 10–20)
BUN SERPL-MCNC: 9 MG/DL (ref 6–20)
BUN/CREAT SERPL: 14 (ref 7–25)
CALCIUM SERPL-MCNC: 8.7 MG/DL (ref 8.4–10.2)
CHLORIDE SERPL-SCNC: 104 MMOL/L (ref 98–107)
CO2 SERPL-SCNC: 28 MMOL/L (ref 21–32)
CREAT SERPL-MCNC: 0.63 MG/DL (ref 0.51–0.95)
DEPRECATED RDW RBC: 38.5 FL (ref 39–50)
ERYTHROCYTE [DISTWIDTH] IN BLOOD: 12 % (ref 11–15)
FASTING DURATION TIME PATIENT: ABNORMAL H
GFR SERPLBLD BASED ON 1.73 SQ M-ARVRAT: 90 ML/MIN
GLUCOSE SERPL-MCNC: 119 MG/DL (ref 70–99)
HCT VFR BLD CALC: 42.1 % (ref 36–46.5)
HGB BLD-MCNC: 14.3 G/DL (ref 12–15.5)
MCH RBC QN AUTO: 30.2 PG (ref 26–34)
MCHC RBC AUTO-ENTMCNC: 34 G/DL (ref 32–36.5)
MCV RBC AUTO: 88.8 FL (ref 78–100)
NRBC BLD MANUAL-RTO: 0 /100 WBC
PLATELET # BLD AUTO: 135 K/MCL (ref 140–450)
POTASSIUM SERPL-SCNC: 3.6 MMOL/L (ref 3.4–5.1)
RAINBOW EXTRA TUBES HOLD SPECIMEN: NORMAL
RAINBOW EXTRA TUBES HOLD SPECIMEN: NORMAL
RBC # BLD: 4.74 MIL/MCL (ref 4–5.2)
SARS-COV-2 RNA RESP QL NAA+PROBE: NOT DETECTED
SERVICE CMNT-IMP: NORMAL
SERVICE CMNT-IMP: NORMAL
SODIUM SERPL-SCNC: 138 MMOL/L (ref 135–145)
WBC # BLD: 8.7 K/MCL (ref 4.2–11)

## 2022-01-26 PROCEDURE — 10002800 HB RX 250 W HCPCS: Performed by: INTERNAL MEDICINE

## 2022-01-26 PROCEDURE — 99233 SBSQ HOSP IP/OBS HIGH 50: CPT | Performed by: INTERNAL MEDICINE

## 2022-01-26 PROCEDURE — 10002801 HB RX 250 W/O HCPCS

## 2022-01-26 PROCEDURE — 10004651 HB RX, NO CHARGE ITEM: Performed by: INTERNAL MEDICINE

## 2022-01-26 PROCEDURE — 80048 BASIC METABOLIC PNL TOTAL CA: CPT | Performed by: INTERNAL MEDICINE

## 2022-01-26 PROCEDURE — 13000024 HB GI COMPLEX CASE S/U + 1ST 15 MIN

## 2022-01-26 PROCEDURE — 10002807 HB RX 258: Performed by: INTERNAL MEDICINE

## 2022-01-26 PROCEDURE — 10002800 HB RX 250 W HCPCS

## 2022-01-26 PROCEDURE — 10002803 HB RX 637: Performed by: INTERNAL MEDICINE

## 2022-01-26 PROCEDURE — 85027 COMPLETE CBC AUTOMATED: CPT | Performed by: INTERNAL MEDICINE

## 2022-01-26 PROCEDURE — 36415 COLL VENOUS BLD VENIPUNCTURE: CPT | Performed by: INTERNAL MEDICINE

## 2022-01-26 PROCEDURE — 13000001 HB PHASE II RECOVERY EA 30 MINUTES

## 2022-01-26 PROCEDURE — 0DB78ZX EXCISION OF STOMACH, PYLORUS, VIA NATURAL OR ARTIFICIAL OPENING ENDOSCOPIC, DIAGNOSTIC: ICD-10-PCS | Performed by: INTERNAL MEDICINE

## 2022-01-26 PROCEDURE — 10004451 HB PACU RECOVERY 1ST 30 MINUTES

## 2022-01-26 PROCEDURE — 10000002 HB ROOM CHARGE MED SURG

## 2022-01-26 PROCEDURE — 87635 SARS-COV-2 COVID-19 AMP PRB: CPT | Performed by: INTERNAL MEDICINE

## 2022-01-26 PROCEDURE — 88305 TISSUE EXAM BY PATHOLOGIST: CPT | Performed by: INTERNAL MEDICINE

## 2022-01-26 PROCEDURE — 13000008 HB ANESTHESIA MAC OUTSIDE OR

## 2022-01-26 RX ORDER — POTASSIUM CHLORIDE 20 MEQ/1
40 TABLET, EXTENDED RELEASE ORAL ONCE
Status: DISCONTINUED | OUTPATIENT
Start: 2022-01-26 | End: 2022-01-27 | Stop reason: HOSPADM

## 2022-01-26 RX ORDER — CARVEDILOL 12.5 MG/1
12.5 TABLET ORAL EVERY 12 HOURS SCHEDULED
Status: DISCONTINUED | OUTPATIENT
Start: 2022-01-26 | End: 2022-01-27

## 2022-01-26 RX ORDER — KETAMINE HYDROCHLORIDE 50 MG/ML
INJECTION, SOLUTION, CONCENTRATE INTRAMUSCULAR; INTRAVENOUS PRN
Status: DISCONTINUED | OUTPATIENT
Start: 2022-01-26 | End: 2022-01-26

## 2022-01-26 RX ORDER — PANTOPRAZOLE SODIUM 40 MG/1
40 TABLET, DELAYED RELEASE ORAL 2 TIMES DAILY
Qty: 60 TABLET | Refills: 0 | Status: SHIPPED | OUTPATIENT
Start: 2022-01-26 | End: 2022-02-25

## 2022-01-26 RX ORDER — MIDAZOLAM HYDROCHLORIDE 1 MG/ML
INJECTION, SOLUTION INTRAMUSCULAR; INTRAVENOUS PRN
Status: DISCONTINUED | OUTPATIENT
Start: 2022-01-26 | End: 2022-01-26

## 2022-01-26 RX ORDER — CARVEDILOL 6.25 MG/1
6.25 TABLET ORAL ONCE
Status: COMPLETED | OUTPATIENT
Start: 2022-01-26 | End: 2022-01-26

## 2022-01-26 RX ADMIN — ATORVASTATIN CALCIUM 40 MG: 40 TABLET ORAL at 12:02

## 2022-01-26 RX ADMIN — METOCLOPRAMIDE HYDROCHLORIDE 5 MG: 5 INJECTION INTRAMUSCULAR; INTRAVENOUS at 12:03

## 2022-01-26 RX ADMIN — LORAZEPAM 1 MG: 2 INJECTION INTRAMUSCULAR; INTRAVENOUS at 02:01

## 2022-01-26 RX ADMIN — SODIUM CHLORIDE, PRESERVATIVE FREE 2 ML: 5 INJECTION INTRAVENOUS at 22:06

## 2022-01-26 RX ADMIN — CARVEDILOL 6.25 MG: 6.25 TABLET, FILM COATED ORAL at 12:02

## 2022-01-26 RX ADMIN — HYDROCODONE BITARTRATE AND ACETAMINOPHEN 1 TABLET: 10; 325 TABLET ORAL at 15:41

## 2022-01-26 RX ADMIN — METOCLOPRAMIDE HYDROCHLORIDE 5 MG: 5 INJECTION INTRAMUSCULAR; INTRAVENOUS at 05:54

## 2022-01-26 RX ADMIN — SODIUM CHLORIDE, PRESERVATIVE FREE 2 ML: 5 INJECTION INTRAVENOUS at 12:04

## 2022-01-26 RX ADMIN — METOCLOPRAMIDE HYDROCHLORIDE 5 MG: 5 INJECTION INTRAMUSCULAR; INTRAVENOUS at 15:35

## 2022-01-26 RX ADMIN — KETAMINE HYDROCHLORIDE 10 MG: 50 INJECTION, SOLUTION INTRAMUSCULAR; INTRAVENOUS at 09:38

## 2022-01-26 RX ADMIN — CARVEDILOL 6.25 MG: 6.25 TABLET, FILM COATED ORAL at 13:59

## 2022-01-26 RX ADMIN — PANTOPRAZOLE SODIUM 40 MG: 40 TABLET, DELAYED RELEASE ORAL at 12:02

## 2022-01-26 RX ADMIN — SODIUM CHLORIDE: 9 INJECTION, SOLUTION INTRAVENOUS at 01:55

## 2022-01-26 RX ADMIN — PROPOFOL 100 MCG/KG/MIN: 10 INJECTION, EMULSION INTRAVENOUS at 09:37

## 2022-01-26 RX ADMIN — DULOXETINE HYDROCHLORIDE 60 MG: 60 CAPSULE, DELAYED RELEASE ORAL at 12:02

## 2022-01-26 RX ADMIN — LORAZEPAM 1 MG: 2 INJECTION INTRAMUSCULAR; INTRAVENOUS at 09:12

## 2022-01-26 RX ADMIN — TRAZODONE HYDROCHLORIDE 150 MG: 50 TABLET ORAL at 23:33

## 2022-01-26 RX ADMIN — SODIUM CHLORIDE, POTASSIUM CHLORIDE, SODIUM LACTATE AND CALCIUM CHLORIDE 500 ML: 600; 310; 30; 20 INJECTION, SOLUTION INTRAVENOUS at 22:15

## 2022-01-26 RX ADMIN — PANTOPRAZOLE SODIUM 40 MG: 40 TABLET, DELAYED RELEASE ORAL at 22:14

## 2022-01-26 RX ADMIN — HYDRALAZINE HYDROCHLORIDE 10 MG: 20 INJECTION INTRAMUSCULAR; INTRAVENOUS at 15:35

## 2022-01-26 RX ADMIN — MIDAZOLAM HYDROCHLORIDE 4 MG: 1 INJECTION, SOLUTION INTRAMUSCULAR; INTRAVENOUS at 09:37

## 2022-01-26 ASSESSMENT — PAIN SCALES - GENERAL
PAINLEVEL_OUTOF10: 7
PAINLEVEL_OUTOF10: 0
PAINLEVEL_OUTOF10: 0
PAINLEVEL_OUTOF10: 4
PAINLEVEL_OUTOF10: 0
PAINLEVEL_OUTOF10: 6

## 2022-01-26 ASSESSMENT — ENCOUNTER SYMPTOMS: HEADACHES: 1

## 2022-01-26 ASSESSMENT — PAIN SCALES - WONG BAKER
WONGBAKER_NUMERICALRESPONSE: 0
WONGBAKER_NUMERICALRESPONSE: 0

## 2022-01-27 VITALS
TEMPERATURE: 98.2 F | BODY MASS INDEX: 32.27 KG/M2 | RESPIRATION RATE: 14 BRPM | OXYGEN SATURATION: 95 % | SYSTOLIC BLOOD PRESSURE: 115 MMHG | HEART RATE: 77 BPM | WEIGHT: 182.1 LBS | HEIGHT: 63 IN | DIASTOLIC BLOOD PRESSURE: 67 MMHG

## 2022-01-27 LAB
ANION GAP SERPL CALC-SCNC: 8 MMOL/L (ref 10–20)
ASR DISCLAIMER: NORMAL
BUN SERPL-MCNC: 24 MG/DL (ref 6–20)
BUN/CREAT SERPL: 18 (ref 7–25)
CALCIUM SERPL-MCNC: 9.1 MG/DL (ref 8.4–10.2)
CASE RPRT: NORMAL
CHLORIDE SERPL-SCNC: 103 MMOL/L (ref 98–107)
CLINICAL INFO: NORMAL
CO2 SERPL-SCNC: 29 MMOL/L (ref 21–32)
CREAT SERPL-MCNC: 1.35 MG/DL (ref 0.51–0.95)
DEPRECATED RDW RBC: 39 FL (ref 39–50)
ERYTHROCYTE [DISTWIDTH] IN BLOOD: 12.3 % (ref 11–15)
FASTING DURATION TIME PATIENT: ABNORMAL H
GFR SERPLBLD BASED ON 1.73 SQ M-ARVRAT: 39 ML/MIN
GLUCOSE SERPL-MCNC: 115 MG/DL (ref 70–99)
HCT VFR BLD CALC: 43.5 % (ref 36–46.5)
HGB BLD-MCNC: 14.6 G/DL (ref 12–15.5)
MCH RBC QN AUTO: 29.5 PG (ref 26–34)
MCHC RBC AUTO-ENTMCNC: 33.6 G/DL (ref 32–36.5)
MCV RBC AUTO: 87.9 FL (ref 78–100)
NRBC BLD MANUAL-RTO: 0 /100 WBC
PATH REPORT.FINAL DX SPEC: NORMAL
PATH REPORT.GROSS SPEC: NORMAL
PLATELET # BLD AUTO: 127 K/MCL (ref 140–450)
POTASSIUM SERPL-SCNC: 3.1 MMOL/L (ref 3.4–5.1)
RBC # BLD: 4.95 MIL/MCL (ref 4–5.2)
SODIUM SERPL-SCNC: 137 MMOL/L (ref 135–145)
WBC # BLD: 8.4 K/MCL (ref 4.2–11)

## 2022-01-27 PROCEDURE — 10002803 HB RX 637: Performed by: INTERNAL MEDICINE

## 2022-01-27 PROCEDURE — 36415 COLL VENOUS BLD VENIPUNCTURE: CPT | Performed by: INTERNAL MEDICINE

## 2022-01-27 PROCEDURE — 99239 HOSP IP/OBS DSCHRG MGMT >30: CPT | Performed by: INTERNAL MEDICINE

## 2022-01-27 PROCEDURE — 10004651 HB RX, NO CHARGE ITEM: Performed by: INTERNAL MEDICINE

## 2022-01-27 PROCEDURE — 10002800 HB RX 250 W HCPCS: Performed by: INTERNAL MEDICINE

## 2022-01-27 PROCEDURE — 80048 BASIC METABOLIC PNL TOTAL CA: CPT | Performed by: INTERNAL MEDICINE

## 2022-01-27 PROCEDURE — 85027 COMPLETE CBC AUTOMATED: CPT | Performed by: INTERNAL MEDICINE

## 2022-01-27 RX ORDER — METOPROLOL SUCCINATE 25 MG/1
50 TABLET, EXTENDED RELEASE ORAL DAILY
Qty: 60 TABLET | Refills: 0 | Status: SHIPPED | COMMUNITY
Start: 2022-01-27 | End: 2022-02-26

## 2022-01-27 RX ORDER — METOPROLOL SUCCINATE 25 MG/1
25 TABLET, EXTENDED RELEASE ORAL EVERY 12 HOURS
Qty: 60 TABLET | Refills: 0 | Status: SHIPPED | COMMUNITY
Start: 2022-01-27 | End: 2022-01-27 | Stop reason: SDUPTHER

## 2022-01-27 RX ADMIN — PANTOPRAZOLE SODIUM 40 MG: 40 TABLET, DELAYED RELEASE ORAL at 08:53

## 2022-01-27 RX ADMIN — ACETAMINOPHEN 650 MG: 325 TABLET ORAL at 04:43

## 2022-01-27 RX ADMIN — SODIUM CHLORIDE, PRESERVATIVE FREE 2 ML: 5 INJECTION INTRAVENOUS at 09:02

## 2022-01-27 RX ADMIN — HYDROCODONE BITARTRATE AND ACETAMINOPHEN 1 TABLET: 10; 325 TABLET ORAL at 10:35

## 2022-01-27 RX ADMIN — CARVEDILOL 12.5 MG: 12.5 TABLET, FILM COATED ORAL at 08:53

## 2022-01-27 RX ADMIN — ATORVASTATIN CALCIUM 40 MG: 40 TABLET ORAL at 08:53

## 2022-01-27 RX ADMIN — DULOXETINE HYDROCHLORIDE 60 MG: 60 CAPSULE, DELAYED RELEASE ORAL at 08:53

## 2022-01-27 RX ADMIN — METOCLOPRAMIDE HYDROCHLORIDE 5 MG: 5 INJECTION INTRAMUSCULAR; INTRAVENOUS at 06:11

## 2022-01-27 RX ADMIN — BUTALBITAL, ACETAMINOPHEN AND CAFFEINE 1 TABLET: 50; 325; 40 TABLET ORAL at 08:57

## 2022-01-27 ASSESSMENT — PAIN SCALES - GENERAL
PAINLEVEL_OUTOF10: 0
PAINLEVEL_OUTOF10: 0
PAINLEVEL_OUTOF10: 6
PAINLEVEL_OUTOF10: 8
PAINLEVEL_OUTOF10: 8

## 2022-01-28 ENCOUNTER — HOSPITAL ENCOUNTER (OUTPATIENT)
Dept: PHYSICAL MEDICINE AND REHAB | Age: 72
Discharge: STILL ADMITTED OR ADMINISTRATIVE | End: 2022-01-28
Attending: ORTHOPAEDIC SURGERY

## 2022-01-28 PROCEDURE — 97140 MANUAL THERAPY 1/> REGIONS: CPT | Performed by: PHYSICAL THERAPIST

## 2022-01-28 PROCEDURE — 97161 PT EVAL LOW COMPLEX 20 MIN: CPT | Performed by: PHYSICAL THERAPIST

## 2022-01-28 ASSESSMENT — ENCOUNTER SYMPTOMS
QUALITY: ACHE
PAIN SCALE AT LOWEST: 0
PAIN SCALE AT HIGHEST: 4
PAIN SEVERITY NOW: 0
PAIN LOCATION: LEFT SHOULDER
QUALITY: SHARP
ALLEVIATING FACTORS: REST
ALLEVIATING FACTORS: PRESCRIPTION MEDICATIONS

## 2022-02-01 ENCOUNTER — HOSPITAL ENCOUNTER (OUTPATIENT)
Dept: PHYSICAL MEDICINE AND REHAB | Age: 72
Discharge: STILL ADMITTED OR ADMINISTRATIVE | End: 2022-02-01
Attending: ORTHOPAEDIC SURGERY

## 2022-02-01 PROCEDURE — 97110 THERAPEUTIC EXERCISES: CPT | Performed by: PHYSICAL THERAPY ASSISTANT

## 2022-02-01 PROCEDURE — 97140 MANUAL THERAPY 1/> REGIONS: CPT | Performed by: PHYSICAL THERAPY ASSISTANT

## 2022-02-03 ENCOUNTER — HOSPITAL ENCOUNTER (OUTPATIENT)
Dept: PHYSICAL MEDICINE AND REHAB | Age: 72
Discharge: STILL ADMITTED OR ADMINISTRATIVE | End: 2022-02-03

## 2022-02-03 PROCEDURE — 97140 MANUAL THERAPY 1/> REGIONS: CPT | Performed by: PHYSICAL THERAPY ASSISTANT

## 2022-02-03 PROCEDURE — 97110 THERAPEUTIC EXERCISES: CPT | Performed by: PHYSICAL THERAPY ASSISTANT

## 2022-02-08 ENCOUNTER — HOSPITAL ENCOUNTER (OUTPATIENT)
Dept: PHYSICAL MEDICINE AND REHAB | Age: 72
Discharge: STILL ADMITTED OR ADMINISTRATIVE | End: 2022-02-08
Attending: INTERNAL MEDICINE

## 2022-02-08 PROCEDURE — 97110 THERAPEUTIC EXERCISES: CPT | Performed by: PHYSICAL THERAPY ASSISTANT

## 2022-02-08 PROCEDURE — 97140 MANUAL THERAPY 1/> REGIONS: CPT | Performed by: PHYSICAL THERAPY ASSISTANT

## 2022-02-10 ENCOUNTER — APPOINTMENT (OUTPATIENT)
Dept: PHYSICAL MEDICINE AND REHAB | Age: 72
End: 2022-02-10

## 2022-02-15 ENCOUNTER — APPOINTMENT (OUTPATIENT)
Dept: PHYSICAL MEDICINE AND REHAB | Age: 72
End: 2022-02-15

## 2022-02-17 ENCOUNTER — APPOINTMENT (OUTPATIENT)
Dept: PHYSICAL MEDICINE AND REHAB | Age: 72
End: 2022-02-17

## 2022-02-22 ENCOUNTER — APPOINTMENT (OUTPATIENT)
Dept: PHYSICAL MEDICINE AND REHAB | Age: 72
End: 2022-02-22

## 2022-02-24 ENCOUNTER — APPOINTMENT (OUTPATIENT)
Dept: PHYSICAL MEDICINE AND REHAB | Age: 72
End: 2022-02-24

## 2022-02-28 ENCOUNTER — HOSPITAL ENCOUNTER (EMERGENCY)
Age: 72
Discharge: HOME | End: 2022-02-28
Attending: EMERGENCY MEDICINE

## 2022-02-28 VITALS
OXYGEN SATURATION: 96 % | RESPIRATION RATE: 18 BRPM | SYSTOLIC BLOOD PRESSURE: 127 MMHG | WEIGHT: 181 LBS | DIASTOLIC BLOOD PRESSURE: 97 MMHG | HEIGHT: 63 IN | TEMPERATURE: 98.6 F | HEART RATE: 83 BPM | BODY MASS INDEX: 32.07 KG/M2

## 2022-02-28 DIAGNOSIS — R10.84 GENERALIZED ABDOMINAL PAIN: Primary | ICD-10-CM

## 2022-02-28 LAB
ALBUMIN SERPL-MCNC: 3.7 G/DL (ref 3.6–5.1)
ALP SERPL-CCNC: 99 UNITS/L (ref 45–117)
ALT SERPL-CCNC: 31 UNITS/L
ANION GAP SERPL CALC-SCNC: 9 MMOL/L (ref 10–20)
AST SERPL-CCNC: 24 UNITS/L
BASOPHILS # BLD: 0 K/MCL (ref 0–0.3)
BASOPHILS NFR BLD: 1 %
BILIRUB CONJ SERPL-MCNC: <0.1 MG/DL (ref 0–0.2)
BILIRUB SERPL-MCNC: 0.5 MG/DL (ref 0.2–1)
BUN SERPL-MCNC: 15 MG/DL (ref 6–20)
BUN/CREAT SERPL: 21 (ref 7–25)
CALCIUM SERPL-MCNC: 9.7 MG/DL (ref 8.4–10.2)
CHLORIDE SERPL-SCNC: 109 MMOL/L (ref 98–107)
CO2 SERPL-SCNC: 28 MMOL/L (ref 21–32)
CREAT SERPL-MCNC: 0.72 MG/DL (ref 0.51–0.95)
DEPRECATED RDW RBC: 41.5 FL (ref 39–50)
EOSINOPHIL # BLD: 0 K/MCL (ref 0–0.5)
EOSINOPHIL NFR BLD: 0 %
ERYTHROCYTE [DISTWIDTH] IN BLOOD: 12.4 % (ref 11–15)
FASTING DURATION TIME PATIENT: ABNORMAL H
GFR SERPLBLD BASED ON 1.73 SQ M-ARVRAT: 84 ML/MIN
GLUCOSE SERPL-MCNC: 134 MG/DL (ref 70–99)
HCT VFR BLD CALC: 46.7 % (ref 36–46.5)
HGB BLD-MCNC: 15.6 G/DL (ref 12–15.5)
IMM GRANULOCYTES # BLD AUTO: 0.1 K/MCL (ref 0–0.2)
IMM GRANULOCYTES # BLD: 1 %
LIPASE SERPL-CCNC: 151 UNITS/L (ref 73–393)
LYMPHOCYTES # BLD: 1.6 K/MCL (ref 1–4)
LYMPHOCYTES NFR BLD: 19 %
MCH RBC QN AUTO: 30.5 PG (ref 26–34)
MCHC RBC AUTO-ENTMCNC: 33.4 G/DL (ref 32–36.5)
MCV RBC AUTO: 91.4 FL (ref 78–100)
MONOCYTES # BLD: 0.4 K/MCL (ref 0.3–0.9)
MONOCYTES NFR BLD: 4 %
NEUTROPHILS # BLD: 6.7 K/MCL (ref 1.8–7.7)
NEUTROPHILS NFR BLD: 75 %
NRBC BLD MANUAL-RTO: 0 /100 WBC
PLATELET # BLD AUTO: 170 K/MCL (ref 140–450)
POTASSIUM SERPL-SCNC: 4 MMOL/L (ref 3.4–5.1)
PROT SERPL-MCNC: 7.7 G/DL (ref 6.4–8.2)
RAINBOW EXTRA TUBES HOLD SPECIMEN: NORMAL
RBC # BLD: 5.11 MIL/MCL (ref 4–5.2)
SODIUM SERPL-SCNC: 142 MMOL/L (ref 135–145)
WBC # BLD: 8.9 K/MCL (ref 4.2–11)

## 2022-02-28 PROCEDURE — 99283 EMERGENCY DEPT VISIT LOW MDM: CPT

## 2022-02-28 PROCEDURE — 80048 BASIC METABOLIC PNL TOTAL CA: CPT | Performed by: EMERGENCY MEDICINE

## 2022-02-28 PROCEDURE — 96361 HYDRATE IV INFUSION ADD-ON: CPT

## 2022-02-28 PROCEDURE — 10002807 HB RX 258

## 2022-02-28 PROCEDURE — 10002800 HB RX 250 W HCPCS: Performed by: EMERGENCY MEDICINE

## 2022-02-28 PROCEDURE — 10002807 HB RX 258: Performed by: EMERGENCY MEDICINE

## 2022-02-28 PROCEDURE — 10002801 HB RX 250 W/O HCPCS: Performed by: EMERGENCY MEDICINE

## 2022-02-28 PROCEDURE — 80076 HEPATIC FUNCTION PANEL: CPT | Performed by: EMERGENCY MEDICINE

## 2022-02-28 PROCEDURE — 96375 TX/PRO/DX INJ NEW DRUG ADDON: CPT

## 2022-02-28 PROCEDURE — 85025 COMPLETE CBC W/AUTO DIFF WBC: CPT | Performed by: EMERGENCY MEDICINE

## 2022-02-28 PROCEDURE — 96374 THER/PROPH/DIAG INJ IV PUSH: CPT

## 2022-02-28 PROCEDURE — 83690 ASSAY OF LIPASE: CPT | Performed by: EMERGENCY MEDICINE

## 2022-02-28 RX ORDER — METOCLOPRAMIDE HYDROCHLORIDE 5 MG/ML
10 INJECTION INTRAMUSCULAR; INTRAVENOUS ONCE
Status: COMPLETED | OUTPATIENT
Start: 2022-02-28 | End: 2022-02-28

## 2022-02-28 RX ORDER — DIPHENHYDRAMINE HYDROCHLORIDE 50 MG/ML
25 INJECTION INTRAMUSCULAR; INTRAVENOUS ONCE
Status: COMPLETED | OUTPATIENT
Start: 2022-02-28 | End: 2022-02-28

## 2022-02-28 RX ORDER — DROPERIDOL 2.5 MG/ML
2.5 INJECTION, SOLUTION INTRAMUSCULAR; INTRAVENOUS ONCE
Status: COMPLETED | OUTPATIENT
Start: 2022-02-28 | End: 2022-02-28

## 2022-02-28 RX ORDER — FAMOTIDINE 10 MG/ML
20 INJECTION, SOLUTION INTRAVENOUS ONCE
Status: COMPLETED | OUTPATIENT
Start: 2022-02-28 | End: 2022-02-28

## 2022-02-28 RX ORDER — SODIUM CHLORIDE 9 MG/ML
INJECTION, SOLUTION INTRAVENOUS
Status: COMPLETED
Start: 2022-02-28 | End: 2022-02-28

## 2022-02-28 RX ADMIN — SODIUM CHLORIDE 250 ML: 900 INJECTION, SOLUTION INTRAVENOUS at 13:08

## 2022-02-28 RX ADMIN — SODIUM CHLORIDE 1000 ML: 9 INJECTION, SOLUTION INTRAVENOUS at 10:56

## 2022-02-28 RX ADMIN — FAMOTIDINE 20 MG: 10 INJECTION INTRAVENOUS at 11:57

## 2022-02-28 RX ADMIN — DIPHENHYDRAMINE HYDROCHLORIDE 25 MG: 50 INJECTION INTRAMUSCULAR; INTRAVENOUS at 10:55

## 2022-02-28 RX ADMIN — METOCLOPRAMIDE HYDROCHLORIDE 10 MG: 5 INJECTION INTRAMUSCULAR; INTRAVENOUS at 11:59

## 2022-02-28 RX ADMIN — DROPERIDOL 2.5 MG: 2.5 INJECTION, SOLUTION INTRAMUSCULAR; INTRAVENOUS at 10:57

## 2022-02-28 RX ADMIN — FENTANYL CITRATE 50 MCG: 50 INJECTION INTRAMUSCULAR; INTRAVENOUS at 13:04

## 2022-05-17 ENCOUNTER — APPOINTMENT (OUTPATIENT)
Dept: CT IMAGING | Age: 72
End: 2022-05-17
Attending: EMERGENCY MEDICINE

## 2022-05-17 ENCOUNTER — APPOINTMENT (OUTPATIENT)
Dept: GENERAL RADIOLOGY | Age: 72
End: 2022-05-17
Attending: EMERGENCY MEDICINE

## 2022-05-17 ENCOUNTER — HOSPITAL ENCOUNTER (EMERGENCY)
Age: 72
Discharge: HOME | End: 2022-05-17
Attending: EMERGENCY MEDICINE

## 2022-05-17 VITALS
TEMPERATURE: 97.9 F | BODY MASS INDEX: 31.48 KG/M2 | RESPIRATION RATE: 35 BRPM | WEIGHT: 177.69 LBS | HEIGHT: 63 IN | OXYGEN SATURATION: 99 % | HEART RATE: 79 BPM | DIASTOLIC BLOOD PRESSURE: 68 MMHG | SYSTOLIC BLOOD PRESSURE: 134 MMHG
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
## 2022-05-17 DIAGNOSIS — S22.31XA CLOSED FRACTURE OF ONE RIB OF RIGHT SIDE, INITIAL ENCOUNTER: Primary | ICD-10-CM

## 2022-05-17 DIAGNOSIS — S20.211A CONTUSION OF RIGHT CHEST WALL, INITIAL ENCOUNTER: ICD-10-CM

## 2022-05-17 LAB
ALBUMIN SERPL-MCNC: 3.4 G/DL (ref 3.6–5.1)
ALP SERPL-CCNC: 105 UNITS/L (ref 45–117)
ALT SERPL-CCNC: 30 UNITS/L
ANION GAP SERPL CALC-SCNC: 7 MMOL/L (ref 10–20)
APTT PPP: 26 SEC (ref 22–30)
AST SERPL-CCNC: 19 UNITS/L
BASOPHILS # BLD: 0 K/MCL (ref 0–0.3)
BASOPHILS NFR BLD: 1 %
BILIRUB CONJ SERPL-MCNC: <0.1 MG/DL (ref 0–0.2)
BILIRUB SERPL-MCNC: 0.3 MG/DL (ref 0.2–1)
BUN SERPL-MCNC: 17 MG/DL (ref 6–20)
BUN/CREAT SERPL: 21 (ref 7–25)
CALCIUM SERPL-MCNC: 9.2 MG/DL (ref 8.4–10.2)
CHLORIDE SERPL-SCNC: 109 MMOL/L (ref 98–107)
CO2 SERPL-SCNC: 30 MMOL/L (ref 21–32)
CREAT SERPL-MCNC: 0.8 MG/DL (ref 0.51–0.95)
DEPRECATED RDW RBC: 39.5 FL (ref 39–50)
EOSINOPHIL # BLD: 0.1 K/MCL (ref 0–0.5)
EOSINOPHIL NFR BLD: 2 %
ERYTHROCYTE [DISTWIDTH] IN BLOOD: 11.9 % (ref 11–15)
FASTING DURATION TIME PATIENT: ABNORMAL H
GFR SERPLBLD BASED ON 1.73 SQ M-ARVRAT: 78 ML/MIN
GLUCOSE SERPL-MCNC: 124 MG/DL (ref 70–99)
HCT VFR BLD CALC: 40.8 % (ref 36–46.5)
HGB BLD-MCNC: 13.7 G/DL (ref 12–15.5)
IMM GRANULOCYTES # BLD AUTO: 0 K/MCL (ref 0–0.2)
IMM GRANULOCYTES # BLD: 1 %
INR PPP: 1
LIPASE SERPL-CCNC: 167 UNITS/L (ref 73–393)
LYMPHOCYTES # BLD: 1.4 K/MCL (ref 1–4)
LYMPHOCYTES NFR BLD: 19 %
MCH RBC QN AUTO: 30.7 PG (ref 26–34)
MCHC RBC AUTO-ENTMCNC: 33.6 G/DL (ref 32–36.5)
MCV RBC AUTO: 91.5 FL (ref 78–100)
MONOCYTES # BLD: 0.5 K/MCL (ref 0.3–0.9)
MONOCYTES NFR BLD: 6 %
NEUTROPHILS # BLD: 5.4 K/MCL (ref 1.8–7.7)
NEUTROPHILS NFR BLD: 71 %
NRBC BLD MANUAL-RTO: 0 /100 WBC
PLATELET # BLD AUTO: 154 K/MCL (ref 140–450)
POTASSIUM SERPL-SCNC: 4.1 MMOL/L (ref 3.4–5.1)
PROT SERPL-MCNC: 7 G/DL (ref 6.4–8.2)
PROTHROMBIN TIME: 10.9 SEC (ref 9.7–11.8)
RBC # BLD: 4.46 MIL/MCL (ref 4–5.2)
SODIUM SERPL-SCNC: 142 MMOL/L (ref 135–145)
TROPONIN I SERPL DL<=0.01 NG/ML-MCNC: 5 NG/L
WBC # BLD: 7.5 K/MCL (ref 4.2–11)

## 2022-05-17 PROCEDURE — 80076 HEPATIC FUNCTION PANEL: CPT | Performed by: EMERGENCY MEDICINE

## 2022-05-17 PROCEDURE — 84484 ASSAY OF TROPONIN QUANT: CPT | Performed by: EMERGENCY MEDICINE

## 2022-05-17 PROCEDURE — 85610 PROTHROMBIN TIME: CPT | Performed by: EMERGENCY MEDICINE

## 2022-05-17 PROCEDURE — 85730 THROMBOPLASTIN TIME PARTIAL: CPT | Performed by: EMERGENCY MEDICINE

## 2022-05-17 PROCEDURE — 85025 COMPLETE CBC W/AUTO DIFF WBC: CPT | Performed by: EMERGENCY MEDICINE

## 2022-05-17 PROCEDURE — 10002801 HB RX 250 W/O HCPCS: Performed by: EMERGENCY MEDICINE

## 2022-05-17 PROCEDURE — 10002805 HB CONTRAST AGENT: Performed by: EMERGENCY MEDICINE

## 2022-05-17 PROCEDURE — G1004 CDSM NDSC: HCPCS

## 2022-05-17 PROCEDURE — 71275 CT ANGIOGRAPHY CHEST: CPT

## 2022-05-17 PROCEDURE — 99284 EMERGENCY DEPT VISIT MOD MDM: CPT

## 2022-05-17 PROCEDURE — 80048 BASIC METABOLIC PNL TOTAL CA: CPT | Performed by: EMERGENCY MEDICINE

## 2022-05-17 PROCEDURE — 96374 THER/PROPH/DIAG INJ IV PUSH: CPT

## 2022-05-17 PROCEDURE — 71046 X-RAY EXAM CHEST 2 VIEWS: CPT

## 2022-05-17 PROCEDURE — 74177 CT ABD & PELVIS W/CONTRAST: CPT

## 2022-05-17 PROCEDURE — 93005 ELECTROCARDIOGRAM TRACING: CPT | Performed by: EMERGENCY MEDICINE

## 2022-05-17 PROCEDURE — 83690 ASSAY OF LIPASE: CPT | Performed by: EMERGENCY MEDICINE

## 2022-05-17 RX ORDER — ALPRAZOLAM 0.25 MG/1
1 TABLET ORAL 3 TIMES DAILY PRN
COMMUNITY
Start: 2022-01-25

## 2022-05-17 RX ORDER — ORPHENADRINE CITRATE 100 MG/1
100 TABLET, EXTENDED RELEASE ORAL 2 TIMES DAILY
Qty: 14 TABLET | Refills: 0 | Status: SHIPPED | OUTPATIENT
Start: 2022-05-17 | End: 2022-05-24

## 2022-05-17 RX ADMIN — FENTANYL CITRATE 50 MCG: 50 INJECTION INTRAMUSCULAR; INTRAVENOUS at 16:45

## 2022-05-17 RX ADMIN — IOHEXOL 65 ML: 350 INJECTION, SOLUTION INTRAVENOUS at 17:21

## 2022-05-17 ASSESSMENT — PAIN SCALES - GENERAL
PAINLEVEL_OUTOF10: 7
PAINLEVEL_OUTOF10: 7

## 2022-05-17 ASSESSMENT — PAIN DESCRIPTION - PAIN TYPE: TYPE: ACUTE PAIN

## 2022-05-18 LAB
ATRIAL RATE (BPM): 82
P AXIS (DEGREES): 25
PR-INTERVAL (MSEC): 116
QRS-INTERVAL (MSEC): 76
QT-INTERVAL (MSEC): 384
QTC: 448
R AXIS (DEGREES): 64
RAINBOW EXTRA TUBES HOLD SPECIMEN: NORMAL
RAINBOW EXTRA TUBES HOLD SPECIMEN: NORMAL
REPORT TEXT: NORMAL
T AXIS (DEGREES): 57
VENTRICULAR RATE EKG/MIN (BPM): 82

## 2022-11-10 ENCOUNTER — WALK IN (OUTPATIENT)
Dept: URGENT CARE | Age: 72
End: 2022-11-10
Attending: FAMILY MEDICINE

## 2022-11-10 ENCOUNTER — HOSPITAL ENCOUNTER (OUTPATIENT)
Dept: GENERAL RADIOLOGY | Age: 72
Discharge: HOME | End: 2022-11-10
Attending: FAMILY MEDICINE

## 2022-11-10 VITALS
WEIGHT: 178 LBS | DIASTOLIC BLOOD PRESSURE: 87 MMHG | SYSTOLIC BLOOD PRESSURE: 168 MMHG | OXYGEN SATURATION: 98 % | HEIGHT: 63 IN | RESPIRATION RATE: 16 BRPM | HEART RATE: 100 BPM | BODY MASS INDEX: 31.54 KG/M2 | TEMPERATURE: 97.6 F

## 2022-11-10 DIAGNOSIS — M25.561 ACUTE PAIN OF BOTH KNEES: ICD-10-CM

## 2022-11-10 DIAGNOSIS — M25.562 ACUTE PAIN OF BOTH KNEES: Primary | ICD-10-CM

## 2022-11-10 DIAGNOSIS — M25.562 ACUTE PAIN OF BOTH KNEES: ICD-10-CM

## 2022-11-10 DIAGNOSIS — M25.561 ACUTE PAIN OF BOTH KNEES: Primary | ICD-10-CM

## 2022-11-10 PROCEDURE — 99212 OFFICE O/P EST SF 10 MIN: CPT

## 2022-11-10 PROCEDURE — 73564 X-RAY EXAM KNEE 4 OR MORE: CPT

## 2022-11-10 ASSESSMENT — PAIN SCALES - GENERAL
PAINLEVEL: 8
PAINLEVEL_OUTOF10: 8
PAINLEVEL_OUTOF10: 8

## 2022-11-15 ASSESSMENT — ENCOUNTER SYMPTOMS
ADENOPATHY: 0
SHORTNESS OF BREATH: 0
COUGH: 0
NUMBNESS: 0
WEAKNESS: 0
ABDOMINAL PAIN: 0
NAUSEA: 0
VOMITING: 0
FEVER: 0
BACK PAIN: 0

## 2023-04-12 NOTE — PROGRESS NOTES
Per verbal order from Dr. Lena Rodriguez, draw up 0.5ml of 0.5% Marcaine and 20 mg of Depo-Medrol for cortisone injection to right foot.  Jaylene Ontiveros n/a

## 2023-07-06 ENCOUNTER — HOSPITAL ENCOUNTER (OUTPATIENT)
Dept: GENERAL RADIOLOGY | Facility: HOSPITAL | Age: 73
Discharge: HOME | End: 2023-07-06
Attending: ORTHOPAEDIC SURGERY
Payer: COMMERCIAL

## 2023-07-06 DIAGNOSIS — M75.111 NONTRAUMATIC INCOMPLETE TEAR OF RIGHT ROTATOR CUFF: ICD-10-CM

## 2023-07-06 PROCEDURE — 73030 X-RAY EXAM OF SHOULDER: CPT | Performed by: ORTHOPAEDIC SURGERY

## 2023-07-17 ENCOUNTER — HOSPITAL ENCOUNTER (OUTPATIENT)
Dept: MRI IMAGING | Age: 73
Discharge: HOME | End: 2023-07-17
Attending: ORTHOPAEDIC SURGERY
Payer: COMMERCIAL

## 2023-07-17 DIAGNOSIS — M75.111 INCOMPLETE TEAR OF RIGHT ROTATOR CUFF: Primary | ICD-10-CM

## 2023-07-17 DIAGNOSIS — M75.111 INCOMPLETE TEAR OF RIGHT ROTATOR CUFF: ICD-10-CM

## 2023-07-17 PROCEDURE — 73221 MRI JOINT UPR EXTREM W/O DYE: CPT | Performed by: ORTHOPAEDIC SURGERY

## 2023-07-24 ENCOUNTER — APPOINTMENT (OUTPATIENT)
Dept: MRI IMAGING | Age: 73
End: 2023-07-24
Attending: ORTHOPAEDIC SURGERY

## 2023-08-10 ENCOUNTER — NURSE ONLY (OUTPATIENT)
Dept: INTERNAL MEDICINE CLINIC | Facility: HOSPITAL | Age: 73
End: 2023-08-10
Attending: EMERGENCY MEDICINE

## 2023-08-10 DIAGNOSIS — Z00.00 WELLNESS EXAMINATION: Primary | ICD-10-CM

## 2023-08-10 LAB
RUBV IGG SER QL: POSITIVE
RUBV IGG SER-ACNC: 50.7 IU/ML (ref 10–?)

## 2023-08-10 PROCEDURE — 86787 VARICELLA-ZOSTER ANTIBODY: CPT

## 2023-08-10 PROCEDURE — 86762 RUBELLA ANTIBODY: CPT

## 2023-08-10 PROCEDURE — 86765 RUBEOLA ANTIBODY: CPT

## 2023-08-10 PROCEDURE — 86735 MUMPS ANTIBODY: CPT

## 2023-08-10 PROCEDURE — 86480 TB TEST CELL IMMUN MEASURE: CPT

## 2023-08-11 LAB
M TB IFN-G CD4+ T-CELLS BLD-ACNC: 0.01 IU/ML
M TB TUBERC IFN-G BLD QL: NEGATIVE
M TB TUBERC IGNF/MITOGEN IGNF CONTROL: >10 IU/ML
MEV IGG SER-ACNC: >300 AU/ML (ref 16.5–?)
MUV IGG SER IA-ACNC: 188 AU/ML (ref 11–?)
QFT TB1 AG MINUS NIL: 0.02 IU/ML
QFT TB2 AG MINUS NIL: 0.01 IU/ML
VZV IGG SER IA-ACNC: >4000 (ref 165–?)

## 2023-11-09 ENCOUNTER — EXTERNAL RECORD (OUTPATIENT)
Dept: OTHER | Age: 73
End: 2023-11-09

## 2023-11-21 ENCOUNTER — HOSPITAL ENCOUNTER (OUTPATIENT)
Dept: MRI IMAGING | Age: 73
Discharge: HOME | End: 2023-11-21
Attending: ORTHOPAEDIC SURGERY

## 2023-11-21 DIAGNOSIS — M75.111 INCOMPLETE TEAR OF RIGHT ROTATOR CUFF: ICD-10-CM

## 2023-11-21 PROCEDURE — 73221 MRI JOINT UPR EXTREM W/O DYE: CPT

## 2024-01-17 ENCOUNTER — WALK IN (OUTPATIENT)
Dept: URGENT CARE | Age: 74
End: 2024-01-17
Attending: EMERGENCY MEDICINE

## 2024-01-17 VITALS
TEMPERATURE: 97.6 F | HEART RATE: 70 BPM | OXYGEN SATURATION: 98 % | RESPIRATION RATE: 16 BRPM | SYSTOLIC BLOOD PRESSURE: 152 MMHG | DIASTOLIC BLOOD PRESSURE: 74 MMHG

## 2024-01-17 DIAGNOSIS — H65.02 NON-RECURRENT ACUTE SEROUS OTITIS MEDIA OF LEFT EAR: Primary | ICD-10-CM

## 2024-01-17 RX ORDER — FLUTICASONE PROPIONATE 50 MCG
1 SPRAY, SUSPENSION (ML) NASAL 2 TIMES DAILY
Qty: 16 G | Refills: 12 | Status: SHIPPED | OUTPATIENT
Start: 2024-01-17

## 2024-01-17 ASSESSMENT — PAIN SCALES - GENERAL
PAINLEVEL_OUTOF10: 0
PAINLEVEL: 0

## 2024-04-10 ENCOUNTER — ORDER TRANSCRIPTION (OUTPATIENT)
Dept: PHYSICAL THERAPY | Facility: HOSPITAL | Age: 74
End: 2024-04-10

## 2024-04-10 DIAGNOSIS — M25.511 RIGHT SHOULDER PAIN: Primary | ICD-10-CM

## 2024-04-15 ENCOUNTER — TELEPHONE (OUTPATIENT)
Dept: PHYSICAL THERAPY | Facility: HOSPITAL | Age: 74
End: 2024-04-15

## 2024-04-17 ENCOUNTER — APPOINTMENT (OUTPATIENT)
Dept: PHYSICAL THERAPY | Age: 74
End: 2024-04-17
Attending: STUDENT IN AN ORGANIZED HEALTH CARE EDUCATION/TRAINING PROGRAM
Payer: COMMERCIAL

## 2024-04-24 ENCOUNTER — OFFICE VISIT (OUTPATIENT)
Dept: PHYSICAL THERAPY | Age: 74
End: 2024-04-24
Attending: STUDENT IN AN ORGANIZED HEALTH CARE EDUCATION/TRAINING PROGRAM
Payer: COMMERCIAL

## 2024-04-24 DIAGNOSIS — M25.511 RIGHT SHOULDER PAIN: Primary | ICD-10-CM

## 2024-04-24 PROCEDURE — 97161 PT EVAL LOW COMPLEX 20 MIN: CPT | Performed by: PHYSICAL THERAPIST

## 2024-04-24 PROCEDURE — 97110 THERAPEUTIC EXERCISES: CPT | Performed by: PHYSICAL THERAPIST

## 2024-04-24 NOTE — PROGRESS NOTES
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 Did not have PT after.   Relevant PMH: fibromyalgia, bilateral shoulder pain, bilateral TKA.  Precautions:  None  Imaging/Tests: multiple     Pain Summary:  0-8/10     Functional Limitations:   Dressing/Bathing:   Current: difficult      Prior: not limited  Lifting:   Current: avoiding      Prior: not limited  Overhead Activity:   Current: avoiding      Prior: not limited  Household Maintainence:   Current: minimal      Prior: not limited  Disturbed Sleep: Yes: occasionally  Sleeping Posture:Supine   Leisure Activities: walking  Work Activities: desk  Patient Goals: golf    Symptoms aggravated by:    Symptoms relieved by:   On the move, Lifting/Carrying, and reaching At rest      OBJECTIVE:      Postural Summary:  fair posture         ROM Summary: Flex R: 150, L: 160,  abd R: 90, L: 110, ER R: 50, L: 60, IR BB R: L5 (P L@), R: T6         Special Tests Summary: N/T        Flexibility Summary: NT          Strength Summary: elbow flex: R: 4-, L: 5,  elbow ext R: 4-, L: 5,  ER R: 3,  L: 4+,  IR R: 3+, L: 5        Sensation:  intact to light touch  Dural Signs: neg     Palpation Summary: very tender R upper arm, UT, scap - pt has fibromyalgia    Additional Information / Findings:  -  Today’s Treatment:  AROM 5 min, PROM 5 min, pt ed  HEP: Handouts given  Pt. Education: Patient counseled to maintain / resume normal activities.  Postural Education     Signs and symptoms are consistent with patient’s diagnosis.  Functional impairments include:Disturbed sleep, Difficulty or limited tolerance for ADLs including: lifting and reaching, dressing, and Unable to participate in normal exercise and / or sports activities    Rehab  Potential:good  Education or treatment limitation: None    Assessment: Pt presents to physical therapy evaluation with primary c/o R shoulder pain.  Functional deficits include but are not limited to lifting, reaching, dressing.  Signs and symptoms are consistent with diagnosis of recovery s/p reverse TSA. Pt and PT discussed evaluation findings, pathology, POC and HEP.  Pt voiced understanding and performs HEP correctly without reported pain. Skilled Physical Therapy is medically necessary to address the above impairments and reach functional goals.     Quickdash 37%    Plan of Care:  Treatment will focus on the following goals:       Long term goals to be reached in 12 visits.  Pt. will report decreased pain from 8/10 to 4/10 at worst.  Restore R shoulder AROM flex to 160 degrees to allow pt to reach into overhead cabinets to retrieve dinner plates  Pt. will be able to perform ADLs with min symptoms.  No pain with dressing and grooming  Pt. will be independent with home exercise program and self management.  Sleep minimally disturbed by discomfort    Patient will be seen 2 x /week for 4-6 weeks or a total of 8-12 visits.   Treatment will include:   Manual therapy to address joint and/or soft tissue mobility  Therapeutic exercises  Pt. education for posture, body mechanics, and ergonomics  HEP instruction                                                             Pt. was advised regarding the findings of this evaluation and agrees to the plan of care.   Therapeutic Exercise (15 min ea)  [id number]: 11 minutes  Physical Therapy Eval.  [id number]    In agreement with evaluation findings and clinical rationale, this evaluation involved Low Complexity decision making due to 1-2 personal factors/comorbidities, 3 body structures involved/activity limitations, and evolving symptoms including changing pain levels.  Are you being hurt, frightened, demeaned, or taken advantage of by anyone at your home or in your life?   No        Have you recently had thoughts of hurting yourself?  No    Have you tried to hurt yourself in the past?  No

## 2024-04-29 ENCOUNTER — OFFICE VISIT (OUTPATIENT)
Dept: PHYSICAL THERAPY | Age: 74
End: 2024-04-29
Attending: STUDENT IN AN ORGANIZED HEALTH CARE EDUCATION/TRAINING PROGRAM
Payer: COMMERCIAL

## 2024-04-29 PROCEDURE — 97110 THERAPEUTIC EXERCISES: CPT | Performed by: PHYSICAL THERAPIST

## 2024-04-29 NOTE — PROGRESS NOTES
Dx:  R shoulder pain           Authorized # of Visits:  12         Next MD visit: none scheduled  Fall Risk: standard         Precautions: s/p R reverse TSA 1/4/24, fibromyalgia           Medication Changes since last visit?: No  Subjective: doing HEP, shoulder feeling better, swung a golf club without pain. Still stiff and limited ROM.    Current pain 0-3/10      Objective: Reviewed HEP. Rx per grid.    ROM: Flex R: 150, L: 160,  abd R: 90, L: 110, ER R: 50, L: 60, IR BB R: L5 (Passive to L2), R: T6    Date: 4/29/2024  Tx#: 2/12 Date:   Tx#: 3/ Date:   Tx#: 4/ Date:   Tx#: 5/ Date:   Tx#: 6/ Date:   Tx#: 7/ Date:   Tx#: 8/   Seated pulleys flex x 3 min         UBE fwd/retro x 6 min, L1         Bicep curl 3# x 30         YTB tricep press 3x10         Supine cane press x 30         Supine cane flex x 30 supine flyes x 30         PROM R shoulder x 9 min         STM R shoulder/UT/lev scap x 2 min                               Assessment: pt does well with increased activity, end range pain and tightness in R shoulder.      Goals:   to be reached in 12 visits.  Pt. will report decreased pain from 8/10 to 4/10 at worst.  Restore R shoulder AROM flex to 160 degrees to allow pt to reach into overhead cabinets to retrieve dinner plates  Pt. will be able to perform ADLs with min symptoms.  No pain with dressing and grooming  Pt. will be independent with home exercise program and self management.  Sleep minimally disturbed by discomfort    Plan: cont current HEP, gradually increase activity as tolerated and consider hitting some golf balls.    Skilled Services: TE, MT    Charges: TE3       Total Timed Treatment: TE 38 min, MT 2 min  Total Treatment Time: 40 min

## 2024-05-01 ENCOUNTER — OFFICE VISIT (OUTPATIENT)
Dept: PHYSICAL THERAPY | Age: 74
End: 2024-05-01
Attending: STUDENT IN AN ORGANIZED HEALTH CARE EDUCATION/TRAINING PROGRAM
Payer: COMMERCIAL

## 2024-05-01 PROCEDURE — 97110 THERAPEUTIC EXERCISES: CPT | Performed by: PHYSICAL THERAPIST

## 2024-05-01 NOTE — PROGRESS NOTES
Dx:  R shoulder pain           Authorized # of Visits:  12         Next MD visit: none scheduled  Fall Risk: standard         Precautions: s/p R reverse TSA 1/4/24, fibromyalgia           Medication Changes since last visit?: No  Subjective: doing HEP, shoulder sore today, she thinks she may have slept wrong.    Current pain 1-3/10      Objective: progressed treatment - symptoms subside and improve with treatment and AROM improves. Pt has multiple tender areas around anterior shoulder, AC joint and pec.    ROM: Flex R: 150, L: 160,  abd R: 100, L: 110, ER R: 50, L: 60, IR BB R: L5 (Passive to L2), R: T6    Date: 4/29/2024  Tx#: 2/12 Date: 5/1/24  Tx#: 3/12 Date:   Tx#: 4/ Date:   Tx#: 5/ Date:   Tx#: 6/ Date:   Tx#: 7/ Date:   Tx#: 8/   Seated pulleys flex x 3 min Seated pulleys flex x 3 min        UBE fwd/retro x 6 min, L1 UBE fwd/retro x 6 min, L1.5        Bicep curl 3# x 30 Bicep curl 4# x 30        YTB tricep press 3x10 YTB tricep press 3x10        Supine cane press x 30 Supine cane press x 30        Supine cane flex x 30 supine flyes x 30 Supine cane flex, hor abd x 30 ea.supine flyes x 30         Supine circles 2# CW/CCW x 30 ea        PROM R shoulder x 9 min PROM R shoulder x 7 min        STM R shoulder/UT/lev scap x 2 min STM R shoulder/pec, deltoid/UT x 4 min                              Assessment: pt does well with increased activity, improving AROM R shoulder.      Goals:   to be reached in 12 visits.  Pt. will report decreased pain from 8/10 to 4/10 at worst.  Restore R shoulder AROM flex to 160 degrees to allow pt to reach into overhead cabinets to retrieve dinner plates  Pt. will be able to perform ADLs with min symptoms.  No pain with dressing and grooming  Pt. will be independent with home exercise program and self management.  Sleep minimally disturbed by discomfort    Plan: cont current HEP, gradually increase activity as tolerated and consider hitting some golf balls.    Skilled Services: TE,  MT    Charges: TE3       Total Timed Treatment: TE 38 min, MT 4 min  Total Treatment Time: 42 min

## 2024-05-06 ENCOUNTER — OFFICE VISIT (OUTPATIENT)
Dept: PHYSICAL THERAPY | Age: 74
End: 2024-05-06
Attending: STUDENT IN AN ORGANIZED HEALTH CARE EDUCATION/TRAINING PROGRAM
Payer: COMMERCIAL

## 2024-05-06 PROCEDURE — 97110 THERAPEUTIC EXERCISES: CPT | Performed by: PHYSICAL THERAPIST

## 2024-05-06 PROCEDURE — 97140 MANUAL THERAPY 1/> REGIONS: CPT | Performed by: PHYSICAL THERAPIST

## 2024-05-06 NOTE — PROGRESS NOTES
Dx:  R shoulder pain           Authorized # of Visits:  12         Next MD visit: none scheduled  Fall Risk: standard         Precautions: s/p R reverse TSA 1/4/24, fibromyalgia           Medication Changes since last visit?: No  Subjective: pt states R shoulder has been more sore, pt unsure if it was PT or gardening but definitely more sore in anterior shoulder.    Current pain 1-5/10      Objective: modified treatment to minimize irritation - symptoms subside and improve with treatment. Pt has multiple tender areas around anterior shoulder, AC joint and pec. R anterior shoulder very sensitive corresponding to R long head of biceps.    ROM: Flex R: 150, L: 160,  abd R: 80, L: 110, ER R: 50, L: 60, IR BB R: L5 (Passive to L2), L: T6    Date: 4/29/2024  Tx#: 2/12 Date: 5/1/24  Tx#: 3/12 Date: 5/6/24  Tx#: 4/12 Date:   Tx#: 5/ Date:   Tx#: 6/ Date:   Tx#: 7/ Date:   Tx#: 8/   Seated pulleys flex x 3 min Seated pulleys flex x 3 min Seated pulleys flex x 3 min       UBE fwd/retro x 6 min, L1 UBE fwd/retro x 6 min, L1.5 -       Bicep curl 3# x 30 Bicep curl 4# x 30 Bicep curl 0# x 30       YTB tricep press 3x10 YTB tricep press 3x10 defers       Supine cane press x 30 Supine cane press x 30 Supine cane press x 30       Supine cane flex x 30 supine flyes x 30 Supine cane flex, hor abd x 30 ea.supine flyes x 30 Supine cane flex, hor abd x 30 ea.supine flyes x 30        Supine circles 2# CW/CCW x 30 ea Supine circles 0# CW/CCW x 30 ea       PROM R shoulder x 9 min PROM R shoulder x 7 min PROM R shoulder x 8 min       STM R shoulder/UT/lev scap x 2 min STM R shoulder/pec, deltoid/UT x 4 min STM R shoulder/pec, deltoid/UT x 8 min                             Assessment: symptoms more irritable, possible biceps involvement. Responds to treatment with min discomfort remaining post session; 3/10 with movement.      Goals:   to be reached in 12 visits.  Pt. will report decreased pain from 8/10 to 4/10 at worst.  Restore R shoulder  AROM flex to 160 degrees to allow pt to reach into overhead cabinets to retrieve dinner plates  Pt. will be able to perform ADLs with min symptoms.  No pain with dressing and grooming  Pt. will be independent with home exercise program and self management.  Sleep minimally disturbed by discomfort    Plan: cont current HEP, monitor symptoms and modify activity as needed until next visit.    Skilled Services: TE, MT    Charges: TE2, MT1       Total Timed Treatment: TE 30 min, MT 8 min  Total Treatment Time: 38 min

## 2024-05-08 ENCOUNTER — OFFICE VISIT (OUTPATIENT)
Dept: PHYSICAL THERAPY | Age: 74
End: 2024-05-08
Attending: STUDENT IN AN ORGANIZED HEALTH CARE EDUCATION/TRAINING PROGRAM
Payer: COMMERCIAL

## 2024-05-08 PROCEDURE — 97140 MANUAL THERAPY 1/> REGIONS: CPT | Performed by: PHYSICAL THERAPIST

## 2024-05-08 PROCEDURE — 97110 THERAPEUTIC EXERCISES: CPT | Performed by: PHYSICAL THERAPIST

## 2024-05-08 NOTE — PROGRESS NOTES
Dx:  R shoulder pain           Authorized # of Visits:  12         Next MD visit: none scheduled  Fall Risk: standard         Precautions: s/p R reverse TSA 1/4/24, fibromyalgia           Medication Changes since last visit?: No  Subjective: pt states R shoulder has felt about the same, no worse since last visit but definitely more sore than at start of PT..    Current pain 1-5/10      Objective: continued modified treatment to minimize irritation - symptoms subside and improve with treatment. Pt has multiple tender areas around anterior shoulder, AC joint and pec. R anterior shoulder remains sensitive corresponding to R long head of biceps.    ROM: mildly limited into flex and abd compared to initial eval.      ROM: Flex R: 140, L: 160,  abd R: 90, L: 110, ER R: 50, L: 60, IR BB R: L5 (Passive to L2), L: T6    Date: 4/29/2024  Tx#: 2/12 Date: 5/1/24  Tx#: 3/12 Date: 5/6/24  Tx#: 4/12 Date: 5/8/24  Tx#: 5/12 Date:   Tx#: 6/ Date:   Tx#: 7/ Date:   Tx#: 8/   Seated pulleys flex x 3 min Seated pulleys flex x 3 min Seated pulleys flex x 3 min Seated pulleys flex x 3 min      UBE fwd/retro x 6 min, L1 UBE fwd/retro x 6 min, L1.5 - Wall ladder x 10 to # 26      Bicep curl 3# x 30 Bicep curl 4# x 30 Bicep curl 0# x 30 Bicep curl 2# x 30      YTB tricep press 3x10 YTB tricep press 3x10 defers       Supine cane press x 30 Supine cane press x 30 Supine cane press x 30 Supine cane press x 30      Supine cane flex x 30 supine flyes x 30 Supine cane flex, hor abd x 30 ea.supine flyes x 30 Supine cane flex, hor abd x 30 ea.supine flyes x 30 Supine cane flex, hor abd x 30 ea.supine flyes x 30       Supine circles 2# CW/CCW x 30 ea Supine circles 0# CW/CCW x 30 ea Supine circles 0# CW/CCW x 30 ea      PROM R shoulder x 9 min PROM R shoulder x 7 min PROM R shoulder x 8 min PROM R shoulder x 8 min      STM R shoulder/UT/lev scap x 2 min STM R shoulder/pec, deltoid/UT x 4 min STM R shoulder/pec, deltoid/UT x 8 min STM R shoulder/pec,  deltoid/UT x 8 min                            Assessment: symptoms moderately irritable, possible biceps involvement. Responds to treatment with min discomfort remaining post session; 3/10 with movement.      Goals:   to be reached in 12 visits.  Pt. will report decreased pain from 8/10 to 4/10 at worst.  Restore R shoulder AROM flex to 160 degrees to allow pt to reach into overhead cabinets to retrieve dinner plates  Pt. will be able to perform ADLs with min symptoms.  No pain with dressing and grooming  Pt. will be independent with home exercise program and self management.  Sleep minimally disturbed by discomfort    Plan: cont current HEP, monitor symptoms and modify activity as needed until next visit.    Skilled Services: RAKESH, MT    Charges: TE2, MT1       Total Timed Treatment: TE 33 min, MT 8 min  Total Treatment Time: 41 min

## 2024-05-15 ENCOUNTER — OFFICE VISIT (OUTPATIENT)
Dept: PHYSICAL THERAPY | Age: 74
End: 2024-05-15
Attending: STUDENT IN AN ORGANIZED HEALTH CARE EDUCATION/TRAINING PROGRAM
Payer: COMMERCIAL

## 2024-05-15 PROCEDURE — 97140 MANUAL THERAPY 1/> REGIONS: CPT | Performed by: PHYSICAL THERAPIST

## 2024-05-15 PROCEDURE — 97110 THERAPEUTIC EXERCISES: CPT | Performed by: PHYSICAL THERAPIST

## 2024-05-15 NOTE — PROGRESS NOTES
DISCHARGE NOTE  Dx:  R shoulder pain           Authorized # of Visits:  12         Next MD visit: none scheduled  Fall Risk: standard         Precautions: s/p R reverse TSA 1/4/24, fibromyalgia           Medication Changes since last visit?: No  Subjective: pt states R shoulder marginally better, sleeping better, with significant stiffness upon awakening, wants to discontinue PT to work on recovery at home.    Current pain 1-3/10      Objective: continued modified treatment to minimize irritation - symptoms subside and improve with treatment. Pt has multiple tender areas around anterior shoulder, AC joint and pec. R anterior shoulder remains sensitive corresponding to R long head of biceps.    ROM: mildly limited into flex and abd compared to initial eval, but improved from last visit.       ROM: Flex R: 140, L: 160,  abd R: 120, L: 130, ER R: 70, L: 80, IR BB R: L5 (Passive to L1), L: T6    Date: 4/29/2024  Tx#: 2/12 Date: 5/1/24  Tx#: 3/12 Date: 5/6/24  Tx#: 4/12 Date: 5/8/24  Tx#: 5/12 Date:   Tx#: 6/ Date:   Tx#: 7/ Date:   Tx#: 8/   Seated pulleys flex x 3 min Seated pulleys flex x 3 min Seated pulleys flex x 3 min Seated pulleys flex x 3 min      UBE fwd/retro x 6 min, L1 UBE fwd/retro x 6 min, L1.5 - Wall ladder x 10 to # 26      Bicep curl 3# x 30 Bicep curl 4# x 30 Bicep curl 0# x 30 Bicep curl 2# x 30      YTB tricep press 3x10 YTB tricep press 3x10 defers       Supine cane press x 30 Supine cane press x 30 Supine cane press x 30 Supine cane press x 30      Supine cane flex x 30 supine flyes x 30 Supine cane flex, hor abd x 30 ea.supine flyes x 30 Supine cane flex, hor abd x 30 ea.supine flyes x 30 Supine cane flex, hor abd x 30 ea.supine flyes x 30       Supine circles 2# CW/CCW x 30 ea Supine circles 0# CW/CCW x 30 ea Supine circles 0# CW/CCW x 30 ea      PROM R shoulder x 9 min PROM R shoulder x 7 min PROM R shoulder x 8 min PROM R shoulder x 8 min      STM R shoulder/UT/lev scap x 2 min STM R  shoulder/pec, deltoid/UT x 4 min STM R shoulder/pec, deltoid/UT x 8 min STM R shoulder/pec, deltoid/UT x 8 min                            Assessment: symptoms improving, possible biceps involvement. Responds to treatment with min discomfort remaining post session; 3/10 with movement. IR BB is improving.      Goals:   to be reached in 12 visits.  Pt. will report decreased pain from 8/10 to 4/10 at worst.- largely met  Restore R shoulder AROM flex to 160 degrees to allow pt to reach into overhead cabinets to retrieve dinner plates- progressing  Pt. will be able to perform ADLs with min symptoms.- progressing  No pain with dressing and grooming- mostly met  Pt. will be independent with home exercise program and self management.- MET  Sleep minimally disturbed by discomfort- progressing    Plan: discharge to [facility] per pt request.    Skilled Services: SALTY CAMERON    Charges: TE2, MT1       Total Timed Treatment: TE 33 min, MT 8 min  Total Treatment Time: 41 min

## 2024-05-17 ENCOUNTER — APPOINTMENT (OUTPATIENT)
Dept: PHYSICAL THERAPY | Age: 74
End: 2024-05-17
Attending: STUDENT IN AN ORGANIZED HEALTH CARE EDUCATION/TRAINING PROGRAM
Payer: COMMERCIAL

## 2024-05-20 ENCOUNTER — APPOINTMENT (OUTPATIENT)
Dept: PHYSICAL THERAPY | Age: 74
End: 2024-05-20
Attending: STUDENT IN AN ORGANIZED HEALTH CARE EDUCATION/TRAINING PROGRAM
Payer: COMMERCIAL

## 2024-05-22 ENCOUNTER — APPOINTMENT (OUTPATIENT)
Dept: PHYSICAL THERAPY | Age: 74
End: 2024-05-22
Attending: STUDENT IN AN ORGANIZED HEALTH CARE EDUCATION/TRAINING PROGRAM
Payer: COMMERCIAL

## 2024-07-28 ENCOUNTER — WALK IN (OUTPATIENT)
Dept: URGENT CARE | Age: 74
End: 2024-07-28
Attending: EMERGENCY MEDICINE

## 2024-07-28 ENCOUNTER — HOSPITAL ENCOUNTER (OUTPATIENT)
Dept: GENERAL RADIOLOGY | Age: 74
Discharge: HOME | End: 2024-07-28
Attending: EMERGENCY MEDICINE

## 2024-07-28 VITALS
OXYGEN SATURATION: 98 % | SYSTOLIC BLOOD PRESSURE: 124 MMHG | TEMPERATURE: 98.5 F | DIASTOLIC BLOOD PRESSURE: 76 MMHG | HEART RATE: 67 BPM | RESPIRATION RATE: 16 BRPM

## 2024-07-28 DIAGNOSIS — S89.91XA INJURY OF RIGHT KNEE, INITIAL ENCOUNTER: ICD-10-CM

## 2024-07-28 DIAGNOSIS — S89.91XA INJURY OF RIGHT KNEE, INITIAL ENCOUNTER: Primary | ICD-10-CM

## 2024-07-28 PROCEDURE — 73564 X-RAY EXAM KNEE 4 OR MORE: CPT

## 2024-07-28 PROCEDURE — 99212 OFFICE O/P EST SF 10 MIN: CPT

## 2024-07-28 ASSESSMENT — PAIN SCALES - GENERAL: PAINLEVEL: 8

## 2024-09-14 ENCOUNTER — HOSPITAL ENCOUNTER (EMERGENCY)
Age: 74
Discharge: HOME | End: 2024-09-14
Attending: STUDENT IN AN ORGANIZED HEALTH CARE EDUCATION/TRAINING PROGRAM

## 2024-09-14 ENCOUNTER — APPOINTMENT (OUTPATIENT)
Dept: GENERAL RADIOLOGY | Age: 74
End: 2024-09-14
Attending: STUDENT IN AN ORGANIZED HEALTH CARE EDUCATION/TRAINING PROGRAM

## 2024-09-14 VITALS
HEIGHT: 63 IN | SYSTOLIC BLOOD PRESSURE: 140 MMHG | BODY MASS INDEX: 32.54 KG/M2 | WEIGHT: 183.64 LBS | TEMPERATURE: 99.2 F | OXYGEN SATURATION: 97 % | DIASTOLIC BLOOD PRESSURE: 65 MMHG | RESPIRATION RATE: 17 BRPM | HEART RATE: 111 BPM

## 2024-09-14 DIAGNOSIS — E87.6 HYPOKALEMIA: ICD-10-CM

## 2024-09-14 DIAGNOSIS — I47.10 SVT (SUPRAVENTRICULAR TACHYCARDIA) (CMD): Primary | ICD-10-CM

## 2024-09-14 LAB
ALBUMIN SERPL-MCNC: 3.6 G/DL (ref 3.6–5.1)
ALBUMIN/GLOB SERPL: 1.1 {RATIO} (ref 1–2.4)
ALP SERPL-CCNC: 108 UNITS/L (ref 45–117)
ALT SERPL-CCNC: 24 UNITS/L
ANION GAP SERPL CALC-SCNC: 10 MMOL/L (ref 7–19)
AST SERPL-CCNC: 21 UNITS/L
ATRIAL RATE (BPM): 121
BASOPHILS # BLD: 0 K/MCL (ref 0–0.3)
BASOPHILS NFR BLD: 0 %
BILIRUB SERPL-MCNC: 0.5 MG/DL (ref 0.2–1)
BUN SERPL-MCNC: 27 MG/DL (ref 6–20)
BUN/CREAT SERPL: 40 (ref 7–25)
CALCIUM SERPL-MCNC: 8.8 MG/DL (ref 8.4–10.2)
CHLORIDE SERPL-SCNC: 110 MMOL/L (ref 97–110)
CO2 SERPL-SCNC: 26 MMOL/L (ref 21–32)
CREAT SERPL-MCNC: 0.67 MG/DL (ref 0.51–0.95)
DEPRECATED RDW RBC: 38.9 FL (ref 39–50)
EGFRCR SERPLBLD CKD-EPI 2021: >90 ML/MIN/{1.73_M2}
EOSINOPHIL # BLD: 0.1 K/MCL (ref 0–0.5)
EOSINOPHIL NFR BLD: 1 %
ERYTHROCYTE [DISTWIDTH] IN BLOOD: 12 % (ref 11–15)
FASTING DURATION TIME PATIENT: ABNORMAL H
GLOBULIN SER-MCNC: 3.3 G/DL (ref 2–4)
GLUCOSE SERPL-MCNC: 182 MG/DL (ref 70–99)
HCT VFR BLD CALC: 42.5 % (ref 36–46.5)
HGB BLD-MCNC: 14.3 G/DL (ref 12–15.5)
IMM GRANULOCYTES # BLD AUTO: 0 K/MCL (ref 0–0.2)
IMM GRANULOCYTES # BLD: 0 %
LYMPHOCYTES # BLD: 0.5 K/MCL (ref 1–4)
LYMPHOCYTES NFR BLD: 7 %
MAGNESIUM SERPL-MCNC: 1.7 MG/DL (ref 1.7–2.4)
MCH RBC QN AUTO: 29.9 PG (ref 26–34)
MCHC RBC AUTO-ENTMCNC: 33.6 G/DL (ref 32–36.5)
MCV RBC AUTO: 88.7 FL (ref 78–100)
MONOCYTES # BLD: 0.4 K/MCL (ref 0.3–0.9)
MONOCYTES NFR BLD: 5 %
NEUTROPHILS # BLD: 6.8 K/MCL (ref 1.8–7.7)
NEUTROPHILS NFR BLD: 87 %
NRBC BLD MANUAL-RTO: 0 /100 WBC
P AXIS (DEGREES): 44
PLATELET # BLD AUTO: 133 K/MCL (ref 140–450)
POTASSIUM SERPL-SCNC: 3.1 MMOL/L (ref 3.4–5.1)
PR-INTERVAL (MSEC): 170
PROT SERPL-MCNC: 6.9 G/DL (ref 6.4–8.2)
QRS-INTERVAL (MSEC): 76
QT-INTERVAL (MSEC): 314
QTC: 445
R AXIS (DEGREES): 58
RBC # BLD: 4.79 MIL/MCL (ref 4–5.2)
REPORT TEXT: NORMAL
SODIUM SERPL-SCNC: 143 MMOL/L (ref 135–145)
T AXIS (DEGREES): 43
TROPONIN I SERPL DL<=0.01 NG/ML-MCNC: 11 NG/L
VENTRICULAR RATE EKG/MIN (BPM): 121
WBC # BLD: 7.8 K/MCL (ref 4.2–11)

## 2024-09-14 PROCEDURE — 10002803 HB RX 637: Performed by: STUDENT IN AN ORGANIZED HEALTH CARE EDUCATION/TRAINING PROGRAM

## 2024-09-14 PROCEDURE — 10002807 HB RX 258: Performed by: STUDENT IN AN ORGANIZED HEALTH CARE EDUCATION/TRAINING PROGRAM

## 2024-09-14 PROCEDURE — 96374 THER/PROPH/DIAG INJ IV PUSH: CPT

## 2024-09-14 PROCEDURE — 10002800 HB RX 250 W HCPCS

## 2024-09-14 PROCEDURE — 93005 ELECTROCARDIOGRAM TRACING: CPT | Performed by: EMERGENCY MEDICINE

## 2024-09-14 PROCEDURE — 71045 X-RAY EXAM CHEST 1 VIEW: CPT

## 2024-09-14 PROCEDURE — 96361 HYDRATE IV INFUSION ADD-ON: CPT

## 2024-09-14 PROCEDURE — 85025 COMPLETE CBC W/AUTO DIFF WBC: CPT | Performed by: STUDENT IN AN ORGANIZED HEALTH CARE EDUCATION/TRAINING PROGRAM

## 2024-09-14 PROCEDURE — 83735 ASSAY OF MAGNESIUM: CPT | Performed by: STUDENT IN AN ORGANIZED HEALTH CARE EDUCATION/TRAINING PROGRAM

## 2024-09-14 PROCEDURE — 80053 COMPREHEN METABOLIC PANEL: CPT | Performed by: STUDENT IN AN ORGANIZED HEALTH CARE EDUCATION/TRAINING PROGRAM

## 2024-09-14 PROCEDURE — 84484 ASSAY OF TROPONIN QUANT: CPT | Performed by: STUDENT IN AN ORGANIZED HEALTH CARE EDUCATION/TRAINING PROGRAM

## 2024-09-14 PROCEDURE — 99285 EMERGENCY DEPT VISIT HI MDM: CPT

## 2024-09-14 RX ORDER — POTASSIUM CHLORIDE 1500 MG/1
40 TABLET, EXTENDED RELEASE ORAL ONCE
Status: COMPLETED | OUTPATIENT
Start: 2024-09-14 | End: 2024-09-14

## 2024-09-14 RX ORDER — ONDANSETRON 2 MG/ML
4 INJECTION INTRAMUSCULAR; INTRAVENOUS ONCE
Status: COMPLETED | OUTPATIENT
Start: 2024-09-14 | End: 2024-09-14

## 2024-09-14 RX ORDER — ONDANSETRON 2 MG/ML
INJECTION INTRAMUSCULAR; INTRAVENOUS
Status: COMPLETED
Start: 2024-09-14 | End: 2024-09-14

## 2024-09-14 RX ADMIN — POTASSIUM CHLORIDE 40 MEQ: 1500 TABLET, EXTENDED RELEASE ORAL at 02:24

## 2024-09-14 RX ADMIN — ONDANSETRON 4 MG: 2 INJECTION INTRAMUSCULAR; INTRAVENOUS at 01:30

## 2024-09-14 RX ADMIN — SODIUM CHLORIDE 1000 ML: 9 INJECTION, SOLUTION INTRAVENOUS at 01:20

## 2025-02-04 ENCOUNTER — WALK IN (OUTPATIENT)
Dept: URGENT CARE | Age: 75
End: 2025-02-04
Attending: EMERGENCY MEDICINE

## 2025-02-04 ENCOUNTER — HOSPITAL ENCOUNTER (OUTPATIENT)
Dept: GENERAL RADIOLOGY | Age: 75
Discharge: HOME | End: 2025-02-04
Attending: EMERGENCY MEDICINE

## 2025-02-04 VITALS
DIASTOLIC BLOOD PRESSURE: 84 MMHG | SYSTOLIC BLOOD PRESSURE: 127 MMHG | OXYGEN SATURATION: 97 % | TEMPERATURE: 97.3 F | HEART RATE: 77 BPM | RESPIRATION RATE: 16 BRPM

## 2025-02-04 DIAGNOSIS — M25.561 ACUTE PAIN OF RIGHT KNEE: Primary | ICD-10-CM

## 2025-02-04 DIAGNOSIS — T14.90XA INJURY: ICD-10-CM

## 2025-02-04 PROCEDURE — 73562 X-RAY EXAM OF KNEE 3: CPT

## 2025-02-04 PROCEDURE — 99212 OFFICE O/P EST SF 10 MIN: CPT

## 2025-02-04 ASSESSMENT — PAIN SCALES - GENERAL
PAINLEVEL_OUTOF10: 8
PAINLEVEL: 8

## 2025-07-23 ENCOUNTER — WALK IN (OUTPATIENT)
Dept: URGENT CARE | Age: 75
End: 2025-07-23
Attending: EMERGENCY MEDICINE

## 2025-07-23 ENCOUNTER — HOSPITAL ENCOUNTER (OUTPATIENT)
Dept: GENERAL RADIOLOGY | Age: 75
Discharge: HOME | End: 2025-07-23
Attending: EMERGENCY MEDICINE

## 2025-07-23 VITALS
DIASTOLIC BLOOD PRESSURE: 77 MMHG | SYSTOLIC BLOOD PRESSURE: 167 MMHG | OXYGEN SATURATION: 97 % | RESPIRATION RATE: 16 BRPM | HEART RATE: 82 BPM | TEMPERATURE: 97.3 F

## 2025-07-23 DIAGNOSIS — T14.90XA INJURY: ICD-10-CM

## 2025-07-23 DIAGNOSIS — M89.8X6 TIBIAL PAIN: Primary | ICD-10-CM

## 2025-07-23 DIAGNOSIS — S80.12XA HEMATOMA OF LEFT LOWER EXTREMITY, INITIAL ENCOUNTER: ICD-10-CM

## 2025-07-23 PROCEDURE — 73562 X-RAY EXAM OF KNEE 3: CPT

## 2025-07-23 ASSESSMENT — PAIN SCALES - GENERAL
PAINLEVEL_OUTOF10: 4
PAINLEVEL_OUTOF10: 4

## (undated) NOTE — LETTER
Patient Name: Nia Hope  YOB: 1950          MRN number:  [id number]  Date:  4/24/2024  Referring Physician:  Antwon Morel               21st Century Cures Act Notice to Patient: Medical documents like this are made available to patients in the interest of transparency. However, be advised this is a medical document and it is intended as [id number] communication between your medical providers. This medical document may contain abbreviations, assessments, medical data, and results or other terms that are unfamiliar. Medical documents are intended to carry relevant information, facts as evident, and the clinical opinion of the practitioner. As such, this medical document may be written in language that appears blunt or direct. You are encouraged to contact your medical provider and/or formerly [facility] Patient Experience if you have any questions about this medical document.

## (undated) NOTE — MR AVS SNAPSHOT
[facility]ne [address]  [phone]  [phone]               Thank you for choosing us for your health care visit with Anabel Bowles MD.  We are glad to serve you and happy to provide you wit - gabapentin 300 MG Caps            MyChart     Sign up for [email or web address], your secure online medical record. [employer] will allow you to access patient instructions from your recent visit,  view other health information, and more.  To sign up or find more informa Fully enjoy your food when eating. Don’t eat while distracted and slow down. Avoid over sized portions. Don’t eat while when you’re bored.      EAT THESE FOODS MORE OFTEN: EAT THESE FOODS LESS OFTEN:   Make half your plate fruits and vegetables Highly

## (undated) NOTE — LETTER
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 Did not have PT after.   Relevant PMH: fibromyalgia, bilateral shoulder pain, bilateral TKA.  Precautions:  None  Imaging/Tests: multiple      Pain Summary:  0-8/10     Functional Limitations:   Dressing/Bathing:   Current: difficult      Prior: not limited  Lifting:   Current: avoiding      Prior: not limited  Overhead Activity:   Current: avoiding      Prior: not limited  Household Maintainence:   Current: minimal      Prior: not limited  Disturbed Sleep: Yes: occasionally  Sleeping Posture:Supine   Leisure Activities: walking  Work Activities: desk  Patient Goals: golf    Symptoms aggravated by:    Symptoms relieved by:   On the move, Lifting/Carrying, and reaching At rest      OBJECTIVE:       Postural Summary:  fair posture         ROM Summary: Flex R: 150, L: 160,  abd R: 90, L: 110, ER R: 50, L: 60, IR BB R: L5 (P L@), R: T6           Special Tests Summary: N/T         Flexibility Summary: NT            Strength Summary: elbow flex: R: 4-, L: 5,  elbow ext R: 4-, L: 5,  ER R: 3,  L: 4+,  IR R: 3+, L: 5          Sensation:  intact to light touch  Dural Signs: neg      Palpation Summary: very tender R upper arm, UT, scap - pt has fibromyalgia    Additional Information / Findings:  -  Today’s Treatment:  AROM 5 min, PROM 5 min, pt ed  HEP: Handouts given  Pt. Education: Patient counseled to maintain / resume normal activities.  Postural Education      Signs and symptoms are consistent with patient’s diagnosis.  Functional impairments include:Disturbed  sleep, Difficulty or limited tolerance for ADLs including: lifting and reaching, dressing, and Unable to participate in normal exercise and / or sports activities     Rehab Potential:good  Education or treatment limitation: None     Assessment: Pt presents to physical therapy evaluation with primary c/o R shoulder pain.  Functional deficits include but are not limited to lifting, reaching, dressing.  Signs and symptoms are consistent with diagnosis of recovery s/p reverse TSA. Pt and PT discussed evaluation findings, pathology, POC and HEP.  Pt voiced understanding and performs HEP correctly without reported pain. Skilled Physical Therapy is medically necessary to address the above impairments and reach functional goals.      [employer] 37%     Plan of Care:  Treatment will focus on the following goals:         Long term goals to be reached in 12 visits.  Pt. will report decreased pain from 8/10 to 4/10 at worst.  Restore R shoulder AROM flex to 160 degrees to allow pt to reach into overhead cabinets to retrieve dinner plates  Pt. will be able to perform ADLs with min symptoms.  No pain with dressing and grooming  Pt. will be independent with home exercise program and self management.  Sleep minimally disturbed by discomfort     Patient will be seen 2 x /week for 4-6 weeks or a total of 8-12 visits.   Treatment will include:   Manual therapy to address joint and/or soft tissue mobility  Therapeutic exercises  Pt. education for posture, body mechanics, and ergonomics  HEP instruction                                                             Pt. was advised regarding the findings of this evaluation and agrees to the plan of care.        Plan:  skilled Physical Therapy 2 x/week or a total of 12 visits over a 90 day period. Treatment will include: therapeutic exercise, manual therapy       Patient/Family/Caregiver was advised of these findings, precautions, and treatment options and has agreed to actively participate  in planning and for this course of care.    Thank you for your referral. If you have any questions, please contact me at Dept: [phone].    Sincerely,  Electronically signed by therapist: Maximino Whiting, PT, DPT, OCS, Cert MDT      Physician's certification required:  Yes  Please co-sign or sign and return this letter via fax as soon as possible to [phone].   I certify the need for these services furnished under this plan of treatment and while under my care.    X___________________________________________________ Date____________________    Certification From: 4/24/2024  To:7/23/2024@SUBJECTIVETurning [facility]@